# Patient Record
Sex: FEMALE | Race: WHITE | NOT HISPANIC OR LATINO | Employment: FULL TIME | URBAN - METROPOLITAN AREA
[De-identification: names, ages, dates, MRNs, and addresses within clinical notes are randomized per-mention and may not be internally consistent; named-entity substitution may affect disease eponyms.]

---

## 2021-09-22 ENCOUNTER — TELEPHONE (OUTPATIENT)
Dept: FAMILY MEDICINE | Facility: CLINIC | Age: 46
End: 2021-09-22

## 2021-09-22 DIAGNOSIS — K59.00 CONSTIPATION: Primary | ICD-10-CM

## 2021-09-22 RX ORDER — DOCUSATE SODIUM 100 MG/1
100 CAPSULE, LIQUID FILLED ORAL AT BEDTIME
Qty: 30 CAPSULE | Refills: 11 | Status: SHIPPED | OUTPATIENT
Start: 2021-09-22 | End: 2021-10-07 | Stop reason: ALTCHOICE

## 2021-09-22 NOTE — TELEPHONE ENCOUNTER
Pt has apt with Jacuqelyn Garrett 10/7/21 to establish care.   Patient lives at [facility] in [place].    [facility] asking for refill:  Ducosate Sod Cap 100 mg   #30   11 refills  SIg: Take one capsule by mouth at bedtime.

## 2021-10-07 ENCOUNTER — OFFICE VISIT (OUTPATIENT)
Dept: FAMILY MEDICINE | Facility: CLINIC | Age: 46
End: 2021-10-07
Payer: MEDICARE

## 2021-10-07 ENCOUNTER — LAB (OUTPATIENT)
Dept: LAB | Facility: CLINIC | Age: 46
End: 2021-10-07
Payer: MEDICARE

## 2021-10-07 VITALS
BODY MASS INDEX: 36.59 KG/M2 | DIASTOLIC BLOOD PRESSURE: 78 MMHG | TEMPERATURE: 97.6 F | WEIGHT: 219.6 LBS | SYSTOLIC BLOOD PRESSURE: 126 MMHG | HEART RATE: 96 BPM | HEIGHT: 65 IN | OXYGEN SATURATION: 98 % | RESPIRATION RATE: 18 BRPM

## 2021-10-07 DIAGNOSIS — Z12.31 ENCOUNTER FOR SCREENING MAMMOGRAM FOR BREAST CANCER: ICD-10-CM

## 2021-10-07 DIAGNOSIS — F25.0 SCHIZOAFFECTIVE DISORDER, BIPOLAR TYPE (H): ICD-10-CM

## 2021-10-07 DIAGNOSIS — Z79.899 NEED FOR PROPHYLACTIC CHEMOTHERAPY: Primary | ICD-10-CM

## 2021-10-07 DIAGNOSIS — E03.9 HYPOTHYROIDISM, UNSPECIFIED TYPE: Primary | ICD-10-CM

## 2021-10-07 DIAGNOSIS — E03.9 HYPOTHYROIDISM, UNSPECIFIED TYPE: ICD-10-CM

## 2021-10-07 DIAGNOSIS — K59.00 CONSTIPATION, UNSPECIFIED CONSTIPATION TYPE: ICD-10-CM

## 2021-10-07 DIAGNOSIS — Z23 NEED FOR PROPHYLACTIC VACCINATION AND INOCULATION AGAINST INFLUENZA: ICD-10-CM

## 2021-10-07 LAB
CHOLEST SERPL-MCNC: 263 MG/DL
FASTING STATUS PATIENT QL REPORTED: ABNORMAL
FASTING STATUS PATIENT QL REPORTED: ABNORMAL
GLUCOSE BLD-MCNC: 101 MG/DL (ref 70–99)
HBA1C MFR BLD: 5.5 % (ref 0–5.6)
HDLC SERPL-MCNC: 53 MG/DL
LDLC SERPL CALC-MCNC: 167 MG/DL
NONHDLC SERPL-MCNC: 210 MG/DL
TRIGL SERPL-MCNC: 216 MG/DL
TSH SERPL DL<=0.005 MIU/L-ACNC: 1.93 MU/L (ref 0.4–4)

## 2021-10-07 PROCEDURE — 90686 IIV4 VACC NO PRSV 0.5 ML IM: CPT | Performed by: NURSE PRACTITIONER

## 2021-10-07 PROCEDURE — 84443 ASSAY THYROID STIM HORMONE: CPT

## 2021-10-07 PROCEDURE — 80061 LIPID PANEL: CPT

## 2021-10-07 PROCEDURE — 36415 COLL VENOUS BLD VENIPUNCTURE: CPT

## 2021-10-07 PROCEDURE — 99203 OFFICE O/P NEW LOW 30 MIN: CPT | Mod: 25 | Performed by: NURSE PRACTITIONER

## 2021-10-07 PROCEDURE — 90471 IMMUNIZATION ADMIN: CPT | Performed by: NURSE PRACTITIONER

## 2021-10-07 PROCEDURE — 83036 HEMOGLOBIN GLYCOSYLATED A1C: CPT

## 2021-10-07 PROCEDURE — 82947 ASSAY GLUCOSE BLOOD QUANT: CPT

## 2021-10-07 RX ORDER — QUETIAPINE FUMARATE 400 MG/1
400 TABLET, FILM COATED ORAL AT BEDTIME
COMMUNITY
End: 2023-07-20

## 2021-10-07 RX ORDER — LEVOTHYROXINE SODIUM 25 UG/1
25 TABLET ORAL DAILY
COMMUNITY
End: 2021-10-14

## 2021-10-07 RX ORDER — BENZTROPINE MESYLATE 1 MG/1
0.5 TABLET ORAL 2 TIMES DAILY
COMMUNITY
End: 2022-03-21

## 2021-10-07 RX ORDER — FLUPHENAZINE DECANOATE 25 MG/ML
INJECTION, SOLUTION INTRAMUSCULAR; SUBCUTANEOUS
COMMUNITY
End: 2023-07-20

## 2021-10-07 RX ORDER — ACETAMINOPHEN 325 MG/1
650 TABLET ORAL EVERY 6 HOURS PRN
COMMUNITY

## 2021-10-07 RX ORDER — POLYETHYLENE GLYCOL 3350 17 G/17G
1 POWDER, FOR SOLUTION ORAL DAILY PRN
Qty: 850 G | Refills: 11 | Status: SHIPPED | OUTPATIENT
Start: 2021-10-07 | End: 2022-03-28

## 2021-10-07 RX ORDER — ASPIRIN 81 MG
100 TABLET, DELAYED RELEASE (ENTERIC COATED) ORAL DAILY
COMMUNITY
End: 2022-09-13

## 2021-10-07 ASSESSMENT — MIFFLIN-ST. JEOR: SCORE: 1635.94

## 2021-10-07 NOTE — PROGRESS NOTES
Assessment & Plan     Hypothyroidism, unspecified type  Stable with current medication, continue with current levothyroxine dosing.   - TSH with free T4 reflex; Future    Constipation, unspecified constipation type  Work with psych regarding medications.  Start MiraLax and work on a high fiber diet to improve symptoms.  Consider Colonoscopy if symptoms not improving interventions and medications changes.   - polyethylene glycol (MIRALAX) 17 GM/Dose powder; Take 17 g (1 capful) by mouth daily as needed for constipation Hold for loose stools    Need for prophylactic vaccination and inoculation against influenza    - INFLUENZA VACCINE IM > 6 MONTHS VALENT IIV4 (AFLURIA/FLUZONE)    Encounter for screening mammogram for breast cancer    - *MA Screening Digital Bilateral; Future    Return in about 4 weeks (around 11/4/2021), or if symptoms worsen or fail to improve.    KALEB Aguilar CNP  [facility]    Cristina Lewis is a 45 year old who presents for the following health issues  accompanied by her [facility] staff:    HPI     New Patient/Transfer of Care  Chief Complaint   Patient presents with     Establish Care     Forms     recent [facility] [place], physican orders for Admission     Constipation     Imm/Inj     Flu Shot     Constipation      Duration: 3 months    Description:       Frequency of bowel movements: daily if takes MOM       Consistency of stool: hard    Intensity:  mild    Accompanying signs and symptoms: 0       Abdominal pain: no        Rectal pain: YES-only when severely constipated       Blood in stool: YES       Nausea/vomitting: no     History:        Similar problems in past: no     Precipitating or alleviating factors: medication       Medications worsening symptoms: YES    Therapies tried and outcome: MOM, Colace       Chronic laxative use: no     Hoping to go off the Cogentin as this seems to have started symptoms  SAUL Polanco through [place] for med  "management    Review of Systems   Constitutional, HEENT, cardiovascular, pulmonary, gi and gu systems are negative, except as otherwise noted.      Objective    /78   Pulse 96   Temp 97.6  F (36.4  C)   Resp 18   Ht 1.657 m (5' 5.25\")   Wt 99.6 kg (219 lb 9.6 oz)   SpO2 98%   BMI 36.26 kg/m    Body mass index is 36.26 kg/m .  Physical Exam   GENERAL: healthy, alert and no distress  NECK: no adenopathy, no asymmetry, masses, or scars and thyroid normal to palpation  RESP: lungs clear to auscultation - no rales, rhonchi or wheezes  CV: regular rate and rhythm, normal S1 S2, no S3 or S4, no murmur, click or rub, no peripheral edema and peripheral pulses strong  ABDOMEN: soft, nontender, no hepatosplenomegaly, no masses and bowel sounds normal  PSYCH: mentation appears normal and affect flat    Results for orders placed or performed in visit on 10/07/21   Glucose     Status: Abnormal   Result Value Ref Range    Glucose 101 (H) 70 - 99 mg/dL    Patient Fasting > 8hrs? Unknown    Hemoglobin A1c     Status: Normal   Result Value Ref Range    Hemoglobin A1C 5.5 0.0 - 5.6 %   Lipid panel reflex to direct LDL Fasting     Status: Abnormal   Result Value Ref Range    Cholesterol 263 (H) <200 mg/dL    Triglycerides 216 (H) <150 mg/dL    Direct Measure HDL 53 >=50 mg/dL    LDL Cholesterol Calculated 167 (H) <=100 mg/dL    Non HDL Cholesterol 210 (H) <130 mg/dL    Patient Fasting > 8hrs? Unknown     Narrative    Cholesterol  Desirable:  <200 mg/dL    Triglycerides  Normal:  Less than 150 mg/dL  Borderline High:  150-199 mg/dL  High:  200-499 mg/dL  Very High:  Greater than or equal to 500 mg/dL    Direct Measure HDL  Female:  Greater than or equal to 50 mg/dL   Male:  Greater than or equal to 40 mg/dL    LDL Cholesterol  Desirable:  <100mg/dL  Above Desirable:  100-129 mg/dL   Borderline High:  130-159 mg/dL   High:  160-189 mg/dL   Very High:  >= 190 mg/dL    Non HDL Cholesterol  Desirable:  130 mg/dL  Above " Desirable:  130-159 mg/dL  Borderline High:  160-189 mg/dL  High:  190-219 mg/dL  Very High:  Greater than or equal to 220 mg/dL   TSH with free T4 reflex     Status: Normal   Result Value Ref Range    TSH 1.93 0.40 - 4.00 mU/L

## 2021-10-07 NOTE — PATIENT INSTRUCTIONS
Please call [phone] to schedule your mammogram     Add the MiraLax daily as needed for constipation    Follow up if symptoms do not improve or worsen.    Patient Education     Eating a High-Fiber Diet  Fiber is what gives strength and structure to plants. Most grains, beans, vegetables, and fruits contain fiber. Foods rich in fiber are often low in calories and fat, and they fill you up more. They may also reduce your risks for certain health problems. To find out the amount of fiber in canned, packaged, or frozen foods, read the Nutrition Facts label. It tells you how much fiber is in one serving.    Types of fiber and their benefits  There are two types of fiber: insoluble and soluble. They both aid digestion and help you maintain a healthy weight.    Insoluble fiber. This is found in whole grains, cereals, certain fruits and vegetables such as apple skin, corn, and carrots. Insoluble fiber may prevent constipation and reduce the risk for certain types of cancer. It's called insoluble because it doesn't dissolve in water.    Soluble fiber. This type of fiber is in oats, beans, and certain fruits and vegetables such as strawberries and peas. Soluble fiber can reduce cholesterol, which may help lower the risk for heart disease. It also helps control blood sugar levels.  Look for high-fiber foods  Try these foods to add fiber to your diet:    Whole-grain breads and cereals. Try to eat 6 to 8 ounces a day. Include wheat and oat bran cereals, whole-wheat muffins or toast, and corn tortillas in your meals.    Fruits. Try to eat 2 cups a day. Apples, oranges, strawberries, pears, and bananas are good sources. (Note: Fruit juice is low in fiber.)    Vegetables. Try to eat at least 2.5 cups a day. Add asparagus, carrots, broccoli, peas, and corn to your meals.    Beans. One cup of cooked lentils gives you over 15 grams of fiber. Try navy beans, lentils, and chickpeas.    Seeds. A small handful of seeds gives you about  3 grams of fiber. Try sunflower or brandy seeds.  Keep track of your fiber  Keep track of how much fiber you eat. Start by reading food labels. Then eat a variety of foods high in fiber. As you start to eat more fiber, ask your healthcare provider how much water you should be drinking to keep your digestive system working smoothly.  Aim for a certain amount of fiber in your diet each day. The daily value for fiber is 25 grams a day. But some experts advise that women under 50 eat 25 to 28 grams per day and that men under 50 eat 30 to 38 grams per day. After age 50, your daily fiber needs drop to 22 grams for women and 28 grams for men.  Before you reach for the fiber supplements, think about this. Fiber is found naturally in healthy whole foods. It gives you that feeling of fullness after you eat. Taking fiber supplements or eating fiber-enriched foods will not give you this full feeling.  Your fiber intake is a good measure for the quality of your overall diet. If you are missing out on your daily amount of fiber, you may be lacking other important nutrients as well.  [employer] last reviewed this educational content on 7/1/2019 2000-2021 [employer]. All rights reserved. This information is not intended as a substitute for professional medical care. Always follow your healthcare professional's instructions.

## 2021-10-14 DIAGNOSIS — E03.9 HYPOTHYROIDISM, UNSPECIFIED TYPE: Primary | ICD-10-CM

## 2021-10-14 RX ORDER — LEVOTHYROXINE SODIUM 25 UG/1
TABLET ORAL
Qty: 28 TABLET | Refills: 10 | Status: SHIPPED | OUTPATIENT
Start: 2021-10-14 | End: 2022-08-05

## 2021-10-15 ENCOUNTER — TELEPHONE (OUTPATIENT)
Dept: FAMILY MEDICINE | Facility: CLINIC | Age: 46
End: 2021-10-15

## 2021-10-15 DIAGNOSIS — E03.9 HYPOTHYROIDISM, UNSPECIFIED TYPE: ICD-10-CM

## 2021-10-15 RX ORDER — LEVOTHYROXINE SODIUM 25 UG/1
25 TABLET ORAL DAILY
Qty: 28 TABLET | Refills: 10 | Status: CANCELLED | OUTPATIENT
Start: 2021-10-15

## 2021-10-15 NOTE — TELEPHONE ENCOUNTER
[facility] said they did not receive this request.  Requesting to be resent or call them with the Verbal Order.  Pt has one left.  Sona [place] Sec

## 2021-10-26 ENCOUNTER — TELEPHONE (OUTPATIENT)
Dept: FAMILY MEDICINE | Facility: CLINIC | Age: 46
End: 2021-10-26

## 2021-10-26 NOTE — TELEPHONE ENCOUNTER
Reason for call:  Patient reporting a symptom    Symptom or request: Yoselyn from her group home says Debbie has 2 moles she would like to have looked at. One is in the genital area and one in the lower stomach area. She wants to know if she should see Jacquelyn or refer to dermatology.     Phone Number patient can be reached at:  [phone] Yoselyn    Best Time: She will be available at the group home until 3:00 today or tomorrow      Call taken on 10/26/2021 at 12:41 PM by Samia Warren

## 2021-11-01 ENCOUNTER — OFFICE VISIT (OUTPATIENT)
Dept: FAMILY MEDICINE | Facility: CLINIC | Age: 46
End: 2021-11-01
Payer: MEDICARE

## 2021-11-01 VITALS
HEIGHT: 65 IN | WEIGHT: 222 LBS | TEMPERATURE: 98 F | BODY MASS INDEX: 36.99 KG/M2 | RESPIRATION RATE: 12 BRPM | OXYGEN SATURATION: 100 % | HEART RATE: 83 BPM | DIASTOLIC BLOOD PRESSURE: 80 MMHG | SYSTOLIC BLOOD PRESSURE: 130 MMHG

## 2021-11-01 DIAGNOSIS — L82.0 INFLAMED SEBORRHEIC KERATOSIS: Primary | ICD-10-CM

## 2021-11-01 DIAGNOSIS — Z12.83 ENCOUNTER FOR SCREENING FOR MALIGNANT NEOPLASM OF SKIN: ICD-10-CM

## 2021-11-01 PROCEDURE — 99213 OFFICE O/P EST LOW 20 MIN: CPT | Performed by: NURSE PRACTITIONER

## 2021-11-01 ASSESSMENT — MIFFLIN-ST. JEOR: SCORE: 1641.83

## 2021-11-01 NOTE — PROGRESS NOTES
"  Assessment & Plan     Inflamed seborrheic keratosis  Benign appearing skin lesions, offered biopsy due to size and cryotherapy for seborrheic keratosis however Debbie declined at this time. Plan to monitor and follow up with any worsening or ongoing symptoms.     Encounter for screening for malignant neoplasm of skin  Per above.     Return in about 4 weeks (around 11/29/2021), or if symptoms worsen or fail to improve.    KALEB Aguilar CNP  [facility]    Subjective   Debbie is a 46 year old who presents for the following health issues     HPI     Concern - Moles  Onset: Ongoing  Description: Would like looked at, one in genital area and the other is in the lower abdominal area.   Doesn't know if the moles are new or if she has had them for awhile    Review of Systems   Constitutional, HEENT, cardiovascular, pulmonary, gi and gu systems are negative, except as otherwise noted.      Objective    /80 (BP Location: Right arm, Patient Position: Chair, Cuff Size: Adult Large)   Pulse 83   Temp 98  F (36.7  C) (Tympanic)   Resp 12   Ht 1.657 m (5' 5.25\")   Wt 100.7 kg (222 lb)   SpO2 100%   BMI 36.66 kg/m    Body mass index is 36.66 kg/m .  Physical Exam   GENERAL: healthy, alert and no distress  SKIN: keratoses - inflamed seborrheic and 4 mm irregular mole right suprapubic area  PSYCH: mentation appears normal and affect flat                "

## 2021-11-10 ENCOUNTER — ANCILLARY PROCEDURE (OUTPATIENT)
Dept: MAMMOGRAPHY | Facility: CLINIC | Age: 46
End: 2021-11-10
Attending: NURSE PRACTITIONER
Payer: MEDICARE

## 2021-11-10 DIAGNOSIS — Z12.31 ENCOUNTER FOR SCREENING MAMMOGRAM FOR BREAST CANCER: ICD-10-CM

## 2021-11-10 PROCEDURE — 77067 SCR MAMMO BI INCL CAD: CPT | Mod: TC | Performed by: RADIOLOGY

## 2021-12-08 ENCOUNTER — OFFICE VISIT (OUTPATIENT)
Dept: FAMILY MEDICINE | Facility: CLINIC | Age: 46
End: 2021-12-08
Payer: MEDICARE

## 2021-12-08 VITALS
BODY MASS INDEX: 36.32 KG/M2 | SYSTOLIC BLOOD PRESSURE: 110 MMHG | WEIGHT: 218 LBS | HEART RATE: 72 BPM | HEIGHT: 65 IN | TEMPERATURE: 97.8 F | DIASTOLIC BLOOD PRESSURE: 68 MMHG | RESPIRATION RATE: 16 BRPM

## 2021-12-08 DIAGNOSIS — K59.00 CONSTIPATION, UNSPECIFIED CONSTIPATION TYPE: Primary | ICD-10-CM

## 2021-12-08 DIAGNOSIS — E66.812 CLASS 2 OBESITY WITHOUT SERIOUS COMORBIDITY WITH BODY MASS INDEX (BMI) OF 36.0 TO 36.9 IN ADULT, UNSPECIFIED OBESITY TYPE: ICD-10-CM

## 2021-12-08 PROCEDURE — 99213 OFFICE O/P EST LOW 20 MIN: CPT | Performed by: NURSE PRACTITIONER

## 2021-12-08 RX ORDER — POLYETHYLENE GLYCOL 3350 17 G/17G
17 POWDER, FOR SOLUTION ORAL
Qty: 510 G | Refills: 3 | Status: SHIPPED | OUTPATIENT
Start: 2021-12-08 | End: 2022-03-21

## 2021-12-08 ASSESSMENT — MIFFLIN-ST. JEOR: SCORE: 1623.68

## 2021-12-08 NOTE — PROGRESS NOTES
"  Assessment & Plan     Constipation, unspecified constipation type  No acute distress.  Suspect symptoms related to side effects of benztropine as symptoms started with the start of medication.  Previously recommended MiraLax which Debbie takes rarely per the medication administration record.  Recommend taking more regularly with ongoing symptoms as well as working on lifestyle.  Nutrition referral placed.  Symptomatic care and follow up discussed.  - Nutrition Referral; Future  - polyethylene glycol (MIRALAX) 17 GM/Dose powder; Take 17 g by mouth every 3 days For constipation    Class 2 obesity without serious comorbidity with body mass index (BMI) of 36.0 to 36.9 in adult, unspecified obesity type  Work on weight loss.  Nutrition referral per above.   - Nutrition Referral; Future      Return in about 4 weeks (around 1/5/2022), or if symptoms worsen or fail to improve.    KALEB Aguilar CNP  M [facility]    Subjective   Debbie is a 46 year old who presents for the following health issues  accompanied by her group home staff.    HPI     Constipation  Onset/Duration: \"awhile\"   Description:  Frequency of bowel movements: every other   Consistency of stool: hard   Progression of Symptoms: same  Accompanying signs and symptoms:    Abdominal pain: no   Rectal pain: no   Blood in stool: YES   Nausea/Vomiting: no   Weight loss or gain: no  History:   Similar problems in past: YES- states many years ago   History of abdominal surgery: no  Chronic laxative use: no   New medications: decrease benztropine   Precipitating or alleviating factors: none  Therapies tried and outcome: docusate sod and gavilax     No family history of colon cancers  Started when she started the benztropine, did not take the MiraLax as she was worried about becoming dependent on medication    Review of Systems   Constitutional, HEENT, cardiovascular, pulmonary, gi and gu systems are negative, except as otherwise " "noted      Objective    /68   Pulse 72   Temp 97.8  F (36.6  C) (Tympanic)   Resp 16   Ht 1.657 m (5' 5.25\")   Wt 98.9 kg (218 lb)   BMI 36.00 kg/m    Body mass index is 36 kg/m .  Physical Exam   GENERAL: healthy, alert and no distress  NECK: no adenopathy, no asymmetry, masses, or scars and thyroid normal to palpation  RESP: lungs clear to auscultation - no rales, rhonchi or wheezes  CV: regular rate and rhythm, normal S1 S2, no S3 or S4, no murmur  ABDOMEN: soft, nontender, no hepatosplenomegaly, no masses and bowel sounds normal  PSYCH: mentation appears normal and affect flat            "

## 2021-12-10 ENCOUNTER — TELEPHONE (OUTPATIENT)
Dept: FAMILY MEDICINE | Facility: CLINIC | Age: 46
End: 2021-12-10
Payer: MEDICARE

## 2021-12-10 NOTE — TELEPHONE ENCOUNTER
Nutrition Education Scheduling Outreach #1:    Call to patient to schedule. Patient declined to schedule.    Mayra Bhagat  [facility]  Diabetes and Nutrition Scheduling

## 2022-02-02 ENCOUNTER — OFFICE VISIT (OUTPATIENT)
Dept: FAMILY MEDICINE | Facility: CLINIC | Age: 47
End: 2022-02-02
Payer: MEDICARE

## 2022-02-02 VITALS
SYSTOLIC BLOOD PRESSURE: 130 MMHG | RESPIRATION RATE: 16 BRPM | HEIGHT: 65 IN | WEIGHT: 224 LBS | BODY MASS INDEX: 37.32 KG/M2 | TEMPERATURE: 98.7 F | DIASTOLIC BLOOD PRESSURE: 74 MMHG | HEART RATE: 88 BPM

## 2022-02-02 DIAGNOSIS — R22.9 LUMP OF SKIN: Primary | ICD-10-CM

## 2022-02-02 PROCEDURE — 10060 I&D ABSCESS SIMPLE/SINGLE: CPT | Performed by: NURSE PRACTITIONER

## 2022-02-02 ASSESSMENT — MIFFLIN-ST. JEOR: SCORE: 1650.9

## 2022-02-02 NOTE — PROGRESS NOTES
"  Assessment & Plan     Lump of skin  Concern for infection with presentation, I and D was completed after informed consent was obtained.  Procedure:  Risks and benefits discussed with pt, she verbalized understanding and wished to proceed with I and D.  0.1 ml of 1% lidocaine with epi injected into area and small incision made with a 16 blade.   0.5 cm clot was evacuated without any purulent fluid. Discomfort resolved following procedure.  No complications during procedure and minimal blood loss.  - Incision and drainage      Return in about 1 week (around 2/9/2022), or if symptoms worsen or fail to improve.    KALEB Aguilar CNP  M [facility]    Cristina Lewis is a 46 year old who presents for the following health issues     HPI     Concern - Bump  Onset: last few days   Description: raised bump on right lower leg   Intensity: moderate  Progression of Symptoms:  worsening  Accompanying Signs & Symptoms: discolored   Previous history of similar problem: none   Therapies tried and outcome:  none     No known injury    Review of Systems   Constitutional, HEENT, cardiovascular, pulmonary, gi and gu systems are negative, except as otherwise noted.      Objective    /74 (Cuff Size: Adult Large)   Pulse 88   Temp 98.7  F (37.1  C) (Tympanic)   Resp 16   Ht 1.657 m (5' 5.25\")   Wt 101.6 kg (224 lb)   BMI 36.99 kg/m    Body mass index is 36.99 kg/m .  Physical Exam   GENERAL: healthy, alert and no distress  SKIN: erythematous tender lump right anterior LE  PSYCH: mentation appears normal, affect normal/bright          "

## 2022-02-03 ENCOUNTER — TELEPHONE (OUTPATIENT)
Dept: FAMILY MEDICINE | Facility: CLINIC | Age: 47
End: 2022-02-03
Payer: MEDICARE

## 2022-02-03 NOTE — TELEPHONE ENCOUNTER
Reason for Call:  Other prescription    Detailed comments: 3 ml LL syrng Mis 22 GX 1 Use as directed to inject fluphenazine Every 2 weeks    Phone Number Patient can be reached at: Other phone number:  [phone]*    Best Time: Any Time      Can we leave a detailed message on this number? YES    Call taken on 2/3/2022 at 12:29 PM by Sona Wilson

## 2022-02-03 NOTE — TELEPHONE ENCOUNTER
I do not prescribe this, please find out who does and send it to them.      Thanks,     KALEB Aguilar CNP

## 2022-02-03 NOTE — TELEPHONE ENCOUNTER
Call placed to group home. This medication is prescribed by VASQUEZ Ferguson.    Group home advised to reach out to provider for regarding ordering needles and syringes.

## 2022-02-15 DIAGNOSIS — F99 MENTAL HEALTH DISORDER: Primary | ICD-10-CM

## 2022-02-15 RX ORDER — SYRINGE WITH NEEDLE, 1 ML 25GX5/8"
SYRINGE, EMPTY DISPOSABLE MISCELLANEOUS
Qty: 50 EACH | Refills: 10 | Status: SHIPPED | OUTPATIENT
Start: 2022-02-15

## 2022-02-22 ENCOUNTER — TELEPHONE (OUTPATIENT)
Dept: FAMILY MEDICINE | Facility: CLINIC | Age: 47
End: 2022-02-22
Payer: MEDICARE

## 2022-02-22 NOTE — TELEPHONE ENCOUNTER
Reason for Call:  DMV Letter     Detailed comments: Pt is requesting a letter from Provider that she is able to drive on her Psychotic Medications per Pt.  Fax [phone] DMV [facility].  Does pt need appt?   Pt said she had gotten [facility].      Phone Number Patient can be reached at: Home number on file [phone] (home)    Best Time: Any Time      Can we leave a detailed message on this number? YES    Call taken on 2/22/2022 at 9:05 AM by Sona Wilson

## 2022-02-22 NOTE — TELEPHONE ENCOUNTER
Is her psychiatrist able to write this note?  I do not follow her for her mental health.    KALEB Aguilar CNP

## 2022-03-21 ENCOUNTER — OFFICE VISIT (OUTPATIENT)
Dept: FAMILY MEDICINE | Facility: CLINIC | Age: 47
End: 2022-03-21
Payer: MEDICARE

## 2022-03-21 VITALS
HEART RATE: 84 BPM | TEMPERATURE: 97.7 F | WEIGHT: 220 LBS | SYSTOLIC BLOOD PRESSURE: 110 MMHG | HEIGHT: 62 IN | DIASTOLIC BLOOD PRESSURE: 62 MMHG | BODY MASS INDEX: 40.48 KG/M2 | RESPIRATION RATE: 16 BRPM

## 2022-03-21 DIAGNOSIS — K59.03 DRUG-INDUCED CONSTIPATION: ICD-10-CM

## 2022-03-21 DIAGNOSIS — Z01.419 ENCOUNTER FOR GYNECOLOGICAL EXAMINATION WITHOUT ABNORMAL FINDING: ICD-10-CM

## 2022-03-21 DIAGNOSIS — Z00.00 ENCOUNTER FOR MEDICARE ANNUAL WELLNESS EXAM: Primary | ICD-10-CM

## 2022-03-21 DIAGNOSIS — E03.9 HYPOTHYROIDISM, UNSPECIFIED TYPE: ICD-10-CM

## 2022-03-21 PROBLEM — K59.00 CONSTIPATION: Status: ACTIVE | Noted: 2022-03-21

## 2022-03-21 PROCEDURE — 99396 PREV VISIT EST AGE 40-64: CPT | Performed by: NURSE PRACTITIONER

## 2022-03-21 PROCEDURE — G0145 SCR C/V CYTO,THINLAYER,RESCR: HCPCS | Performed by: NURSE PRACTITIONER

## 2022-03-21 PROCEDURE — 87624 HPV HI-RISK TYP POOLED RSLT: CPT | Performed by: NURSE PRACTITIONER

## 2022-03-21 PROCEDURE — 99213 OFFICE O/P EST LOW 20 MIN: CPT | Mod: 25 | Performed by: NURSE PRACTITIONER

## 2022-03-21 RX ORDER — MIRTAZAPINE 15 MG/1
15 TABLET, FILM COATED ORAL AT BEDTIME
COMMUNITY
End: 2023-05-31

## 2022-03-21 ASSESSMENT — ENCOUNTER SYMPTOMS
CHILLS: 0
FEVER: 0
DYSURIA: 0
SHORTNESS OF BREATH: 0
HEARTBURN: 0
HEMATURIA: 0
EYE PAIN: 0
BREAST MASS: 0
CONSTIPATION: 1
COUGH: 0
ROS GI COMMENTS: INTERMITTENT CONSTIPATION
HEMATOCHEZIA: 0
PALPITATIONS: 0
NAUSEA: 0
HEADACHES: 0
ARTHRALGIAS: 0
WEAKNESS: 0
FREQUENCY: 0
JOINT SWELLING: 0
PARESTHESIAS: 0
ABDOMINAL PAIN: 0
NERVOUS/ANXIOUS: 0
DIZZINESS: 1
LIGHT-HEADEDNESS: 1
MYALGIAS: 0
DIARRHEA: 0
SORE THROAT: 0

## 2022-03-21 ASSESSMENT — ACTIVITIES OF DAILY LIVING (ADL): CURRENT_FUNCTION: NO ASSISTANCE NEEDED

## 2022-03-21 NOTE — PROGRESS NOTES
"   SUBJECTIVE:   CC: Debbie Smalls is an 46 year old woman who presents for preventive health visit.       Patient has been advised of split billing requirements and indicates understanding: Yes  Healthy Habits:     In general, how would you rate your overall health?  Good    Frequency of exercise:  2-3 days/week    Duration of exercise:  15-30 minutes    Do you usually eat at least 4 servings of fruit and vegetables a day, include whole grains    & fiber and avoid regularly eating high fat or \"junk\" foods?  No    Taking medications regularly:  No    Barriers to taking medications:  Side effects and Other    Medication side effects:  Lightheadedness    Ability to successfully perform activities of daily living:  No assistance needed    Home Safety:  No safety concerns identified    Hearing Impairment:  No hearing concerns    In the past 6 months, have you been bothered by leaking of urine?  No    In general, how would you rate your overall mental or emotional health?  Fair      PHQ-2 Total Score: 2    Additional concerns today:  No      Today's PHQ-2 Score:   PHQ-2 ( 1999 [employer]) 3/21/2022   Q1: Little interest or pleasure in doing things 1   Q2: Feeling down, depressed or hopeless 1   PHQ-2 Score 2   Q1: Little interest or pleasure in doing things Several days   Q2: Feeling down, depressed or hopeless Several days   PHQ-2 Score 2       Abuse: Current or Past (Physical, Sexual or Emotional) - No  Do you feel safe in your environment? Yes    Have you ever done Advance Care Planning? (For example, a Health Directive, POLST, or a discussion with a medical provider or your loved ones about your wishes): No, advance care planning information given to patient to review.  Patient declined advance care planning discussion at this time.    Social History     Tobacco Use     Smoking status: Never Smoker     Smokeless tobacco: Never Used   Substance Use Topics     Alcohol use: Not Currently         Alcohol Use 3/21/2022 "   Prescreen: >3 drinks/day or >7 drinks/week? Not Applicable       Reviewed orders with patient.  Reviewed health maintenance and updated orders accordingly - Yes  Lab work is in process    Breast Cancer Screening:    FHS-7:   Breast CA Risk Assessment (FHS-7) 11/10/2021 3/21/2022   Did any of your first-degree relatives have breast or ovarian cancer? No Yes   Did any of your relatives have bilateral breast cancer? No Unknown   Did any man in your family have breast cancer? No No   Did any woman in your family have breast and ovarian cancer? No Yes   Did any woman in your family have breast cancer before age 50 y? No Yes   Do you have 2 or more relatives with breast and/or ovarian cancer? No No   Do you have 2 or more relatives with breast and/or bowel cancer? No No     Pertinent mammograms are reviewed under the imaging tab.    History of abnormal Pap smear: denies history of abnormal PAP, reports last done in 2018 in [place]   PAP / HPV Latest Ref Rng & Units 3/21/2022   PAP   Negative for Intraepithelial Lesion or Malignancy (NILM)     Reviewed and updated as needed this visit by clinical staff   Tobacco  Allergies  Meds  Problems  Med Hx  Surg Hx  Fam Hx  Soc   Hx         Hypothyroidism  Patient has been taking levothyroxine 25 MCG daily. Patient says she is not doing well and wants levothyroxine 25 MCG stopped.She does not feel like she needs. She does not feel any different taking medication.  She does not feel tired, no joint pain, stiffness or swelling.  Dry and itchy skin, especially bilateral lower legs.  Lotion helps.    Constipation  Patient has been taking colace 100 MG daily and Mirlax 17 GM every three days.  She feels diet is okay.She does not drink enough fluids.  Her exercise working as a  and walking 2-3/week for 45 minutes.   Started when put on the Cogentin and improved for a few days when this was discontinued but then started mirtazapine and symptoms returned.    Reviewed and  "updated as needed this visit by Provider   Tobacco  Allergies  Meds  Problems  Med Hx  Surg Hx  Fam Hx           Review of Systems   Constitutional: Negative for chills and fever.   HENT: Negative for congestion, ear pain, hearing loss and sore throat.    Eyes: Positive for visual disturbance. Negative for pain.        Blurry vision.  Followed optometrist.  Eye exam on 10/2021. Recommendation for glasses.   Respiratory: Negative for cough and shortness of breath.    Cardiovascular: Negative for chest pain, palpitations and peripheral edema.   Gastrointestinal: Positive for constipation. Negative for abdominal pain, diarrhea, heartburn, hematochezia and nausea.        Intermittent constipation   Breasts:  Negative for tenderness, breast mass and discharge.   Genitourinary: Negative for dysuria, frequency, genital sores, hematuria, pelvic pain, urgency, vaginal bleeding and vaginal discharge.   Musculoskeletal: Negative for arthralgias, joint swelling and myalgias.   Skin: Negative for rash.   Neurological: Positive for dizziness and light-headedness. Negative for weakness, headaches and paresthesias.        Dizziness with taking Seroquel 400 MD daily. Followed by mental health provider.   Psychiatric/Behavioral: Negative for mood changes. The patient is not nervous/anxious.       OBJECTIVE:   /62 (Cuff Size: Adult Large)   Pulse 84   Temp 97.7  F (36.5  C) (Tympanic)   Resp 16   Ht 1.581 m (5' 2.25\")   Wt 99.8 kg (220 lb)   LMP 03/02/2022   BMI 39.92 kg/m    Physical Exam  GENERAL: healthy, alert and no distress  EYES: Eyes grossly normal to inspection, PERRL and conjunctivae and sclerae normal  HENT: ear canals and TM's normal, nose and mouth without ulcers or lesions  NECK: no adenopathy, no asymmetry, masses, or scars and thyroid normal to palpation  RESP: lungs clear to auscultation - no rales, rhonchi or wheezes  BREAST: normal without masses, tenderness or nipple discharge and no palpable " "axillary masses or adenopathy  CV: regular rate and rhythm, normal S1 S2, no S3 or S4, no murmur, click or rub, no peripheral edema and peripheral pulses strong  ABDOMEN: soft, nontender, no hepatosplenomegaly, no masses and bowel sounds normal   (female): normal female external genitalia, normal urethral meatus , vaginal mucosa pink, moist, well rugated and normal cervix, adnexae, and uterus without masses.  MS: no gross musculoskeletal defects noted, no edema  SKIN: no suspicious lesions or rashes  NEURO: Normal strength and tone, mentation intact and speech normal  PSYCH: mentation appears normal, affect flat    Diagnostic Test Results:  Labs reviewed in Epic    ASSESSMENT/PLAN:   (Z00.00) Encounter for Medicare annual wellness exam  (primary encounter diagnosis)    (Z01.419) Encounter for gynecological examination without abnormal finding  Comment: updated today  Plan: Gynecologic Cytology (PAP)         (E03.9) Hypothyroidism, unspecified type  Comment:  Plan to discontinue Levothyroxine 25 MCG per pt request, TSH on 10/7/2021 1.93 and on low dose so reasonable to do a trial off and monitor. Discussed with patient risk of increased constipation with stopping medication. Agreed to implement change and call if experiences increased constipation or other side effects. Recheck TSH in 6-8 weeks.  Plan: TSH with free T4 reflex          (K59.00) Constipation  Comment: worsened again with starting the mirtazapine.  Continue Colace 100 MG daily and Mirlax 17 GM PRN. Exercise and increased fiber in diet encouraged.    COUNSELING:  Reviewed preventive health counseling, as reflected in patient instructions    Estimated body mass index is 39.92 kg/m  as calculated from the following:    Height as of this encounter: 1.581 m (5' 2.25\").    Weight as of this encounter: 99.8 kg (220 lb).    Weight management plan: Discussed healthy diet and exercise guidelines    She reports that she has never smoked. She has never used " smokeless tobacco.      Counseling Resources:  ATP IV Guidelines  Pooled Cohorts Equation Calculator  Breast Cancer Risk Calculator  BRCA-Related Cancer Risk Assessment: FHS-7 Tool  FRAX Risk Assessment  ICSI Preventive Guidelines  Dietary Guidelines for Americans, 2010  USDA's MyPlate  ASA Prophylaxis  Lung CA Screening    KALEB Aguilar CNP  [facility]

## 2022-03-21 NOTE — PATIENT INSTRUCTIONS
Plan to do a trial off the levothyroxine and recheck labs in 2 months with a lab only appointment    Patient Education   Personalized Prevention Plan  You are due for the preventive services outlined below.  Your care team is available to assist you in scheduling these services.  If you have already completed any of these items, please share that information with your care team to update in your medical record.  Health Maintenance Due   Topic Date Due     ANNUAL REVIEW OF HM ORDERS  Never done     Colorectal Cancer Screening  Never done     HIV Screening  Never done     Annual Wellness Visit  Never done     Hepatitis C Screening  Never done     PAP Smear  Never done       Understanding USDA MyPlate  The USDA has guidelines to help you make healthy food choices. These are called MyPlate. MyPlate shows the food groups that make up healthy meals using the image of a place setting. Before you eat, think about the healthiest choices for what to put on your plate or in your cup or bowl. To learn more about building a healthy plate, visit www.FriendFitplate.gov.    The food groups    Fruits. Any fruit or 100% fruit juice counts as part of the Fruit Group. Fruits may be fresh, canned, frozen, or dried, and may be whole, cut-up, or pureed. Make 1/2 of your plate fruits and vegetables.    Vegetables. Any vegetable or 100% vegetable juice counts as a member of the Vegetable Group. Vegetables may be fresh, frozen, canned, or dried. They can be served raw or cooked and may be whole, cut-up, or mashed. Make 1/2 of your plate fruits and vegetables.    Grains. All foods made from grains are part of the Grains Group. These include wheat, rice, oats, cornmeal, and barley. Grains are often used to make foods such as bread, pasta, oatmeal, cereal, tortillas, and grits. Grains should be no more than 1/4 of your plate. At least half of your grains should be whole grains.    Protein. This group includes meat, poultry, seafood, beans and  peas, eggs, processed soy products (such as tofu), nuts (including nut butters), and seeds. Make protein choices no more than 1/4 of your plate. Meat and poultry choices should be lean or low fat.    Dairy. The Dairy Group includes all fluid milk products and foods made from milk that contain calcium, such as yogurt and cheese. (Foods that have little calcium, such as cream, butter, and cream cheese, are not part of this group.) Most dairy choices should be low-fat or fat-free.    Oils. Oils aren't a food group, but they do contain essential nutrients. However it's important to watch your intake of oils. These are fats that are liquid at room temperature. They include canola, corn, olive, soybean, vegetable, and sunflower oil. Foods that are mainly oil include mayonnaise, certain salad dressings, and soft margarines. You likely already get your daily oil allowance from the foods you eat.  Things to limit  Eating healthy also means limiting these things in your diet:       Salt (sodium). Many processed foods have a lot of sodium. To keep sodium intake down, eat fresh vegetables, meats, poultry, and seafood when possible. Purchase low-sodium, reduced-sodium, or no-salt-added food products at the store. And don't add salt to your meals at home. Instead, season them with herbs and spices such as dill, oregano, cumin, and paprika. Or try adding flavor with lemon or lime zest and juice.    Saturated fat. Saturated fats are most often found in animal products such as beef, pork, and chicken. They are often solid at room temperature, such as butter. To reduce your saturated fat intake, choose leaner cuts of meat and poultry. And try healthier cooking methods such as grilling, broiling, roasting, or baking. For a simple lower-fat swap, use plain nonfat yogurt instead of mayonnaise when making potato salad or macaroni salad.    Added sugars. These are sugars added to foods. They are in foods such as ice cream, candy, soda,  fruit drinks, sports drinks, energy drinks, cookies, pastries, jams, and syrups. Cut down on added sugars by sharing sweet treats with a family member or friend. You can also choose fruit for dessert, and drink water or other unsweetened beverages.     [employer] last reviewed this educational content on 6/1/2020 2000-2021 [employer]. All rights reserved. This information is not intended as a substitute for professional medical care. Always follow your healthcare professional's instructions.        Your Health Risk Assessment indicates you feel you are not in good emotional health.    Recreation   Recreation is not limited to sports and team events. It includes any activity that provides relaxation, interest, enjoyment, and exercise. Recreation provides an outlet for physical, mental, and social energy. It can give a sense of worth and achievement. It can help you stay healthy.    Mental Exercise and Social Involvement  Mental and emotional health is as important as physical health. Keep in touch with friends and family. Stay as active as possible. Continue to learn and challenge yourself.   Things you can do to stay mentally active are:    Learn something new, like a foreign language or musical instrument.     Play SCRABBLE or do crossword puzzles. If you cannot find people to play these games with you at home, you can play them with others on your computer through the Internet.     Join a games club--anything from card games to chess or checkers or lawn bowling.     Start a new hobby.     Go back to school.     Volunteer.     Read.   Keep up with world events.     Patient Education     High-Fiber Diet  Fiber is in fruits, vegetables, cereals, and grains. Fiber passes through your body undigested. A high-fiber diet helps food move through your intestinal tract. The added bulk can help prevent constipation. In people with small pouches in the colon (diverticulosis), fiber helps clean out the pouches  along the colon wall. It also prevents new pouches from forming. A high-fiber diet reduces the risk of colon cancer. It also lowers blood cholesterol and prevents high blood sugar in people with diabetes.     The high-fiber foods that are listed below should be part of your diet. If you are not used to eating high-fiber foods, start with 1 or 2 foods from this list. Every 3 to 4 days add a new food to your diet. Do this until you are eating 4 high-fiber foods per day. This should give you 20 to 35 grams of fiber a day. You need to drink a lot of water when you are on this diet. You should have 6 to 8 glasses of water a day. Water makes the fiber swell and increases the benefit.   Foods high in fiber  These foods are high in fiber:    Breads. Breads made with 100% whole-wheat flour; surya, wheat, or rye crackers; whole-grain tortillas, bran muffins    Cereals. Whole-grain and bran cereals with bran (shredded wheat, wheat flakes, raisin bran, corn bran); oatmeal, rolled oats, granola, and brown rice    Fruits. Fresh fruits and their edible skins (pears, prunes, raisins, berries, apples, and apricots); bananas, citrus fruit, mangoes, pineapple; and prune juice    Nuts and seeds. Any nuts and seeds, such as walnuts, peanuts, almonds, and pumpkin seeds    Vegetables. This includes all vegetables. They are best served raw or lightly cooked. Those higher in fiber include green peas, celery, eggplant, potatoes, spinach, broccoli, [place] sprouts, winter squash, carrots, cauliflower, soybeans, lentils, and fresh and dried beans of all kinds.    Other. Popcorn; any spices  If you have diverticulosis  There aren't any specific foods to avoid if you have diverticulosis. But each person is different. There may be some foods that make your symptoms worse. Keep track and don t eat foods that make you feel worse.   [employer] last reviewed this educational content on 2/1/2020 2000-2021 [employer]. All rights  reserved. This information is not intended as a substitute for professional medical care. Always follow your healthcare professional's instructions.

## 2022-03-21 NOTE — PROGRESS NOTES
"Debbie Smalls is 46 year old, here for a preventive care visit.    Assessment & Plan   {Provider  Link to [facility] SmartSet :587557}  {Diagnosis Options:720641}    Growth        {GROWTH:219831}    {BMI Evaluation :270952::\"No weight concerns.\"}    Immunizations     {Vaccine counseling is expected when vaccines are given for the first time.   Vaccine counseling would not be expected for subsequent vaccines (after the first of the series) unless there is significant additional documentation (Optional):045405}  MenB Vaccine {MenB Vaccine:856431}    Anticipatory Guidance    Reviewed age appropriate anticipatory guidance.           Referrals/Ongoing Specialty Care  {Referrals/Ongoing Specialty Care:777302}    Follow Up      No follow-ups on file.    Subjective   {Rooming Staff  Remember to place Screening for Ped Immunizations order or document responses at bottom of note :307488}  No flowsheet data found.  {Patient advised of split billing (Optional):918864}  {MDM Documentation Add On (Optional):77307}  ***    No flowsheet data found.    No flowsheet data found.       No flowsheet data found.  {TIP  Consider immunosuppression as a risk factor for TB:425543}       No flowsheet data found.        No flowsheet data found.  No flowsheet data found.          Teen Screen  {Results  (18-20 YRS):047204}  {Provider  Link to Confidential Note :514373}  No flowsheet data found.    {Review of Systems (Optional):175518}       Objective     Exam    Physical Exam        {Immunization Screening- Place Screening for Ped Immunizations order or choose appropriate list to document responses in note (Optional):416539}    KALEB Aguilar [facility]    The patient was counseled and encouraged to consider modifying their diet and eating habits. She was provided with information on recommended healthy diet options.  The patient was provided with suggestions to help her develop a healthy emotional " lifestyle.

## 2022-03-23 LAB
BKR LAB AP GYN ADEQUACY: NORMAL
BKR LAB AP GYN INTERPRETATION: NORMAL
BKR LAB AP HPV REFLEX: NORMAL
BKR LAB AP LMP: NORMAL
BKR LAB AP PREVIOUS ABNORMAL: NORMAL
PATH REPORT.COMMENTS IMP SPEC: NORMAL
PATH REPORT.COMMENTS IMP SPEC: NORMAL
PATH REPORT.RELEVANT HX SPEC: NORMAL

## 2022-03-25 LAB
HUMAN PAPILLOMA VIRUS 16 DNA: NEGATIVE
HUMAN PAPILLOMA VIRUS 18 DNA: NEGATIVE
HUMAN PAPILLOMA VIRUS FINAL DIAGNOSIS: NORMAL
HUMAN PAPILLOMA VIRUS OTHER HR: NEGATIVE

## 2022-03-26 DIAGNOSIS — K59.00 CONSTIPATION, UNSPECIFIED CONSTIPATION TYPE: ICD-10-CM

## 2022-03-28 RX ORDER — POLYETHYLENE GLYCOL 3350 17 G/17G
1 POWDER, FOR SOLUTION ORAL DAILY PRN
Qty: 850 G | Refills: 11 | Status: SHIPPED | OUTPATIENT
Start: 2022-03-28

## 2022-04-05 ENCOUNTER — TELEPHONE (OUTPATIENT)
Dept: FAMILY MEDICINE | Facility: CLINIC | Age: 47
End: 2022-04-05
Payer: MEDICARE

## 2022-04-05 NOTE — TELEPHONE ENCOUNTER
Medications in question are Seroquel, Prolixin and Remeron. She says her psychiatrist wrote a note but the DMV wanted more information, she called their office back and she tells me they refused to write what she wanted. She currently does not have a drivers liscence and is trying to get one. We spoke for 15 minutes and I repeated at least 10 times that the note for the DMV needs to come from her psychiatrist as he prescribes and manages her medications. She says her old FP in [place] wrote this for her and she is going to schedule an appointment with Jacquelyn to discuss this.

## 2022-04-05 NOTE — TELEPHONE ENCOUNTER
Reason for Call:  DMV - Letter     Detailed comments:  Pt is asking if she can get letter for the DMV that it is ok for you to drive while on her medications.   Call Pt when letter has been completed.     Phone Number Patient can be reached at: Home number on file [phone] (home)    Best Time: Any Time      Can we leave a detailed message on this number? YES    Call taken on 4/5/2022 at 9:02 AM by Sona Wilson

## 2022-04-11 ENCOUNTER — OFFICE VISIT (OUTPATIENT)
Dept: FAMILY MEDICINE | Facility: CLINIC | Age: 47
End: 2022-04-11
Payer: MEDICARE

## 2022-04-11 VITALS
HEART RATE: 88 BPM | RESPIRATION RATE: 16 BRPM | HEIGHT: 62 IN | BODY MASS INDEX: 41.41 KG/M2 | WEIGHT: 225 LBS | DIASTOLIC BLOOD PRESSURE: 82 MMHG | SYSTOLIC BLOOD PRESSURE: 122 MMHG | TEMPERATURE: 98.6 F

## 2022-04-11 DIAGNOSIS — F99 MENTAL HEALTH DISORDER: Primary | ICD-10-CM

## 2022-04-11 PROCEDURE — 99213 OFFICE O/P EST LOW 20 MIN: CPT | Performed by: NURSE PRACTITIONER

## 2022-04-11 NOTE — PROGRESS NOTES
"  Assessment & Plan     Mental health disorder  No acute distress.  Mental health is well controlled and no recent medications changes and followed closely by mental health and medications administered by group home staff. Note provided for DMV.       Return in about 4 weeks (around 5/9/2022), or if symptoms worsen or fail to improve.    KALEB Aguilar CNP  [facility]    Cristina Lewis is a 46 year old who presents for the following health issues     History of Present Illness       Reason for visit:  For a driving evaluation    She eats 2-3 servings of fruits and vegetables daily.She consumes 1 sweetened beverage(s) daily.She exercises with enough effort to increase her heart rate 20 to 29 minutes per day.  She exercises with enough effort to increase her heart rate 4 days per week.   She is taking medications regularly.     [facility]  No recent medication changes  Followed every three months      Review of Systems   Constitutional, HEENT, cardiovascular, pulmonary, gi and gu systems are negative, except as otherwise noted.      Objective    /82 (Cuff Size: Adult Large)   Pulse 88   Temp 98.6  F (37  C) (Tympanic)   Resp 16   Ht 1.581 m (5' 2.25\")   Wt 102.1 kg (225 lb)   LMP 04/02/2022   BMI 40.82 kg/m    Body mass index is 40.82 kg/m .  Physical Exam   GENERAL: healthy, alert and no distress  NEURO: Normal strength and tone, mentation intact and speech normal  PSYCH: mentation appears normal, affect normal/bright        "

## 2022-04-11 NOTE — LETTER
April 11, 2022      Debbie Smalls  [address] [address]        To Whom It May Concern:    Debbie Smalls was seen in our clinic. She can safely operate a motor vehicle while on her medications.      Sincerely,        KALEB Aguilar CNP

## 2022-04-11 NOTE — LETTER
April 11, 2022      Debbie Smalls  [address] [address]        To Whom It May Concern:    Debbie Smalls has been followed by this provider since October 2021.  She can safely operate a motor vehicle while on her medications.      Sincerely,        KALEB Aguilar CNP

## 2022-04-15 ENCOUNTER — TELEPHONE (OUTPATIENT)
Dept: FAMILY MEDICINE | Facility: CLINIC | Age: 47
End: 2022-04-15
Payer: MEDICARE

## 2022-04-15 NOTE — TELEPHONE ENCOUNTER
Jacquelyn issued letter 04-11-22 stating she can safely operat a motor vehicle while on medications.  Pt requesting Jacquelyn add that Jacquelyn is aware psych provider unable to provide opinion re: thiago.  CHITO Chi RN

## 2022-04-15 NOTE — TELEPHONE ENCOUNTER
Please clarify, her mental health provider should be able to write her a letter regarding the state of her mental health so I cannot add that they are unable to provide an opinion.     KALEB Aguilar CNP

## 2022-05-01 PROBLEM — E03.9 HYPOTHYROIDISM, UNSPECIFIED TYPE: Status: ACTIVE | Noted: 2022-05-01

## 2022-07-28 ENCOUNTER — TELEPHONE (OUTPATIENT)
Dept: FAMILY MEDICINE | Facility: CLINIC | Age: 47
End: 2022-07-28

## 2022-07-28 NOTE — TELEPHONE ENCOUNTER
Pt needs a letter, about driving. It needs to say what her condition is and that she is safe to drive on the hiway and public roads. Pt needs this soon. Pt wants letter printed and placed at  when completed. Pt stated that Jacquelyn Garrett has completed on of these letters before.     Can leave detailed message on VM: yes    Marysol Camarena RN

## 2022-07-29 NOTE — TELEPHONE ENCOUNTER
I wrote her a letter in April that you can print. If she needs it signed, I can do it on Monday.    KALEB Aguilar CNP

## 2022-08-01 ENCOUNTER — OFFICE VISIT (OUTPATIENT)
Dept: FAMILY MEDICINE | Facility: CLINIC | Age: 47
End: 2022-08-01
Payer: MEDICARE

## 2022-08-01 VITALS
DIASTOLIC BLOOD PRESSURE: 80 MMHG | HEART RATE: 84 BPM | TEMPERATURE: 98.5 F | WEIGHT: 247 LBS | HEIGHT: 62 IN | SYSTOLIC BLOOD PRESSURE: 124 MMHG | BODY MASS INDEX: 45.45 KG/M2 | RESPIRATION RATE: 16 BRPM

## 2022-08-01 DIAGNOSIS — R73.09 ELEVATED GLUCOSE: ICD-10-CM

## 2022-08-01 DIAGNOSIS — E66.01 MORBID OBESITY (H): ICD-10-CM

## 2022-08-01 DIAGNOSIS — E78.5 HYPERLIPIDEMIA LDL GOAL <160: ICD-10-CM

## 2022-08-01 DIAGNOSIS — E03.9 HYPOTHYROIDISM, UNSPECIFIED TYPE: Primary | ICD-10-CM

## 2022-08-01 LAB
CHOLEST SERPL-MCNC: 285 MG/DL
FASTING STATUS PATIENT QL REPORTED: NO
HBA1C MFR BLD: 5.7 % (ref 0–5.6)
HDLC SERPL-MCNC: 41 MG/DL
LDLC SERPL CALC-MCNC: 171 MG/DL
LDLC SERPL CALC-MCNC: ABNORMAL MG/DL
NONHDLC SERPL-MCNC: 244 MG/DL
TRIGL SERPL-MCNC: 589 MG/DL
TSH SERPL DL<=0.005 MIU/L-ACNC: 2.97 MU/L (ref 0.4–4)

## 2022-08-01 PROCEDURE — 84443 ASSAY THYROID STIM HORMONE: CPT | Performed by: NURSE PRACTITIONER

## 2022-08-01 PROCEDURE — 99214 OFFICE O/P EST MOD 30 MIN: CPT | Performed by: NURSE PRACTITIONER

## 2022-08-01 PROCEDURE — 83721 ASSAY OF BLOOD LIPOPROTEIN: CPT | Mod: 59 | Performed by: NURSE PRACTITIONER

## 2022-08-01 PROCEDURE — 36415 COLL VENOUS BLD VENIPUNCTURE: CPT | Performed by: NURSE PRACTITIONER

## 2022-08-01 PROCEDURE — 83036 HEMOGLOBIN GLYCOSYLATED A1C: CPT | Performed by: NURSE PRACTITIONER

## 2022-08-01 PROCEDURE — 80061 LIPID PANEL: CPT | Performed by: NURSE PRACTITIONER

## 2022-08-01 NOTE — LETTER
August 1, 2022      Debbie Smalls  [address] [address]        To Whom It May Concern:    Debbie Smalls has been followed by this provider since October 2021.  She can safely operate a motor vehicle while on her medications..      Sincerely,        KALEB Aguilar CNP

## 2022-08-01 NOTE — LETTER
August 1, 2022      Debbie Smalls  [address] [address]        To Whom It May Concern:    Debbie Smalls has been followed by this provider since October 2021.  She can safely operate a motor vehicle on public highways and streets while on her medications.  She is compliant with treatments and is doing well.     Sincerely,        KALEB Aguilar CNP

## 2022-08-01 NOTE — LETTER
August 3, 2022      Debbie Smalls  [address] [address]        Dear ,    We are writing to inform you of your test results.    cholesterol is significantly elevated and hemoglobin A1C is elevated at 5.7 putting you in the pre-diabetic range.  Your 10 year risk of a cardiovascular event is calculated at 2.5 % so we can wait on medication at this time but you do need to work on your lifestyle to bring these levels down.  Please increase physical activity to at least 30 minutes of vigorous activity 5 times per week.  Work on decreasing fat/cholesterol as well as carbs/sugar in your diet.     The 10-year ASCVD risk score (Emilio TOMLIN Jr., et al., 2013) is: 2.5%     Values used to calculate the score:       Age: 46 years       Sex: Female       Is Non- : No       Diabetic: No       Tobacco smoker: No       Systolic Blood Pressure: 124 mmHg       Is BP treated: No       HDL Cholesterol: 41 mg/dL       Total Cholesterol: 285 mg/dL       Resulted Orders   TSH with free T4 reflex   Result Value Ref Range    TSH 2.97 0.40 - 4.00 mU/L   Hemoglobin A1c   Result Value Ref Range    Hemoglobin A1C 5.7 (H) 0.0 - 5.6 %      Comment:      Normal <5.7%   Prediabetes 5.7-6.4%    Diabetes 6.5% or higher     Note: Adopted from ADA consensus guidelines.   Lipid panel reflex to direct LDL Non-fasting   Result Value Ref Range    Cholesterol 285 (H) <200 mg/dL    Triglycerides 589 (H) <150 mg/dL    Direct Measure HDL 41 (L) >=50 mg/dL    LDL Cholesterol Calculated        Comment:      Cannot estimate LDL when triglyceride exceeds 400 mg/dL    Non HDL Cholesterol 244 (H) <130 mg/dL    Patient Fasting > 8hrs? No     Narrative    Cholesterol  Desirable:  <200 mg/dL    Triglycerides  Normal:  Less than 150 mg/dL  Borderline High:  150-199 mg/dL  High:  200-499 mg/dL  Very High:  Greater than or equal to 500 mg/dL    Direct Measure HDL  Female:  Greater than or equal to 50 mg/dL   Male:  Greater  than or equal to 40 mg/dL    LDL Cholesterol  Desirable:  <100mg/dL  Above Desirable:  100-129 mg/dL   Borderline High:  130-159 mg/dL   High:  160-189 mg/dL   Very High:  >= 190 mg/dL    Non HDL Cholesterol  Desirable:  130 mg/dL  Above Desirable:  130-159 mg/dL  Borderline High:  160-189 mg/dL  High:  190-219 mg/dL  Very High:  Greater than or equal to 220 mg/dL   LDL cholesterol direct   Result Value Ref Range    LDL Cholesterol Direct 171 (H) <100 mg/dL      Comment:      Age 0-19 years:  Desirable: 0-110 mg/dL   Borderline high: 110-129 mg/dL   High: >= 130 mg/dL    Age 20 years and older:  Desirable: <100mg/dL  Above desirable: 100-129 mg/dL   Borderline high: 130-159 mg/dL   High: 160-189 mg/dL   Very high: >= 190 mg/dL       If you have any questions or concerns, please call the clinic at the number listed above.       Sincerely,      KALEB Aguilar CNP

## 2022-08-01 NOTE — PROGRESS NOTES
Assessment & Plan     Hypothyroidism, unspecified type  TSH normal without medication okay to continue off medication.   - TSH with free T4 reflex; Future  - TSH with free T4 reflex    Morbid obesity (H)  Significant weight gain recently, denies recent medication changes.  Recommend lifestyle changes to bring BMI down.   - Lipid panel reflex to direct LDL Non-fasting; Future  - Hemoglobin A1c; Future  - Hemoglobin A1c  - Lipid panel reflex to direct LDL Non-fasting  - LDL cholesterol direct    Elevated glucose  Cholesterol is significantly elevated as well as hemoglobin A1C is elevated at 5.7 putting her in the pre-diabetic range.  Her 10 year risk of a cardiovascular event is calculated at 2.5 % so we can wait on medication at this time but she does need to work on her lifestyle to bring these levels down.  Please increase physical activity to at least 30 minutes of vigorous activity 5 times per week.  Work on decreasing fat/cholesterol as well as carbs/sugar in your diet  - Hemoglobin A1c; Future  - Hemoglobin A1c    Hyperlipidemia LDL goal <160  Per above.   - Lipid panel reflex to direct LDL Non-fasting; Future  - Lipid panel reflex to direct LDL Non-fasting  - LDL cholesterol direct      Return in about 1 year (around 8/1/2023) for Routine Visit.    KALEB Aguilar [facility]    Cristina Lewis is a 46 year old accompanied by her group home staff, presenting for the following health issues:  Thyroid Problem      History of Present Illness       Reason for visit:  Labs    She eats 2-3 servings of fruits and vegetables daily.She consumes 1 sweetened beverage(s) daily.She exercises with enough effort to increase her heart rate 20 to 29 minutes per day.  She exercises with enough effort to increase her heart rate 4 days per week.   She is taking medications regularly.       Hypothyroidism Follow-up      Since last visit, patient describes the following symptoms: Weight  "stable, no hair loss, no skin changes, no constipation, no loose stools      Review of Systems   Constitutional, HEENT, cardiovascular, pulmonary, gi and gu systems are negative, except as otherwise noted.      Objective    /80 (Cuff Size: Adult Large)   Pulse 84   Temp 98.5  F (36.9  C) (Tympanic)   Resp 16   Ht 1.581 m (5' 2.25\")   Wt 112 kg (247 lb)   BMI 44.81 kg/m    Body mass index is 44.81 kg/m .  Physical Exam   GENERAL: healthy, alert and no distress  NECK: no adenopathy, no asymmetry, masses, or scars and thyroid normal to palpation  RESP: lungs clear to auscultation - no rales, rhonchi or wheezes  CV: regular rate and rhythm, normal S1 S2, no S3 or S4, no murmur  PSYCH: mentation appears normal, affect normal/bright    Results for orders placed or performed in visit on 08/01/22   TSH with free T4 reflex     Status: Normal   Result Value Ref Range    TSH 2.97 0.40 - 4.00 mU/L   Hemoglobin A1c     Status: Abnormal   Result Value Ref Range    Hemoglobin A1C 5.7 (H) 0.0 - 5.6 %   Lipid panel reflex to direct LDL Non-fasting     Status: Abnormal   Result Value Ref Range    Cholesterol 285 (H) <200 mg/dL    Triglycerides 589 (H) <150 mg/dL    Direct Measure HDL 41 (L) >=50 mg/dL    LDL Cholesterol Calculated      Non HDL Cholesterol 244 (H) <130 mg/dL    Patient Fasting > 8hrs? No     Narrative    Cholesterol  Desirable:  <200 mg/dL    Triglycerides  Normal:  Less than 150 mg/dL  Borderline High:  150-199 mg/dL  High:  200-499 mg/dL  Very High:  Greater than or equal to 500 mg/dL    Direct Measure HDL  Female:  Greater than or equal to 50 mg/dL   Male:  Greater than or equal to 40 mg/dL    LDL Cholesterol  Desirable:  <100mg/dL  Above Desirable:  100-129 mg/dL   Borderline High:  130-159 mg/dL   High:  160-189 mg/dL   Very High:  >= 190 mg/dL    Non HDL Cholesterol  Desirable:  130 mg/dL  Above Desirable:  130-159 mg/dL  Borderline High:  160-189 mg/dL  High:  190-219 mg/dL  Very High:  Greater than " or equal to 220 mg/dL   LDL cholesterol direct     Status: Abnormal   Result Value Ref Range    LDL Cholesterol Direct 171 (H) <100 mg/dL               .  ..

## 2022-08-03 DIAGNOSIS — K59.00 CONSTIPATION, UNSPECIFIED CONSTIPATION TYPE: Primary | ICD-10-CM

## 2022-08-03 RX ORDER — DOCUSATE SODIUM 100 MG/1
CAPSULE, LIQUID FILLED ORAL
Qty: 28 CAPSULE | Refills: 11 | Status: SHIPPED | OUTPATIENT
Start: 2022-08-03 | End: 2022-09-13

## 2022-09-13 ENCOUNTER — TELEPHONE (OUTPATIENT)
Dept: FAMILY MEDICINE | Facility: CLINIC | Age: 47
End: 2022-09-13

## 2022-10-11 ENCOUNTER — TELEPHONE (OUTPATIENT)
Dept: FAMILY MEDICINE | Facility: CLINIC | Age: 47
End: 2022-10-11

## 2022-10-11 ENCOUNTER — ALLIED HEALTH/NURSE VISIT (OUTPATIENT)
Dept: FAMILY MEDICINE | Facility: CLINIC | Age: 47
End: 2022-10-11
Payer: MEDICARE

## 2022-10-11 DIAGNOSIS — F99 MENTAL HEALTH DISORDER: Primary | ICD-10-CM

## 2022-10-11 PROCEDURE — 99207 PR NO CHARGE NURSE ONLY: CPT

## 2022-10-11 NOTE — TELEPHONE ENCOUNTER
Debbie was scheduled for a prolixin shot today and every 2 weeks but we do not have any orders for this. She will have her psychiatrist fax the order and chart notes so we can get this ordered. We will call her when we get the orders and have the medication in clinic

## 2023-05-31 ENCOUNTER — OFFICE VISIT (OUTPATIENT)
Dept: FAMILY MEDICINE | Facility: CLINIC | Age: 48
End: 2023-05-31
Payer: MEDICARE

## 2023-05-31 VITALS
HEART RATE: 94 BPM | DIASTOLIC BLOOD PRESSURE: 84 MMHG | BODY MASS INDEX: 44.2 KG/M2 | WEIGHT: 275 LBS | SYSTOLIC BLOOD PRESSURE: 132 MMHG | TEMPERATURE: 98.7 F | HEIGHT: 66 IN | RESPIRATION RATE: 16 BRPM | OXYGEN SATURATION: 97 %

## 2023-05-31 DIAGNOSIS — E03.9 HYPOTHYROIDISM, UNSPECIFIED TYPE: ICD-10-CM

## 2023-05-31 DIAGNOSIS — E78.5 HYPERLIPIDEMIA LDL GOAL <160: ICD-10-CM

## 2023-05-31 DIAGNOSIS — R39.89 URINARY PROBLEM: ICD-10-CM

## 2023-05-31 DIAGNOSIS — Z00.00 ENCOUNTER FOR MEDICARE ANNUAL WELLNESS EXAM: Primary | ICD-10-CM

## 2023-05-31 DIAGNOSIS — E66.01 MORBID OBESITY (H): ICD-10-CM

## 2023-05-31 DIAGNOSIS — Z12.31 VISIT FOR SCREENING MAMMOGRAM: ICD-10-CM

## 2023-05-31 DIAGNOSIS — R73.03 PREDIABETES: ICD-10-CM

## 2023-05-31 DIAGNOSIS — Z12.11 SCREEN FOR COLON CANCER: ICD-10-CM

## 2023-05-31 DIAGNOSIS — Z11.4 SCREENING FOR HIV (HUMAN IMMUNODEFICIENCY VIRUS): ICD-10-CM

## 2023-05-31 LAB
ALBUMIN SERPL BCG-MCNC: 4.5 G/DL (ref 3.5–5.2)
ALBUMIN UR-MCNC: NEGATIVE MG/DL
ALP SERPL-CCNC: 93 U/L (ref 35–104)
ALT SERPL W P-5'-P-CCNC: 42 U/L (ref 10–35)
ANION GAP SERPL CALCULATED.3IONS-SCNC: 10 MMOL/L (ref 7–15)
APPEARANCE UR: CLEAR
AST SERPL W P-5'-P-CCNC: 48 U/L (ref 10–35)
BILIRUB SERPL-MCNC: <0.2 MG/DL
BILIRUB UR QL STRIP: NEGATIVE
BUN SERPL-MCNC: 13.7 MG/DL (ref 6–20)
CALCIUM SERPL-MCNC: 10.4 MG/DL (ref 8.6–10)
CHLORIDE SERPL-SCNC: 102 MMOL/L (ref 98–107)
CHOLEST SERPL-MCNC: 271 MG/DL
COLOR UR AUTO: YELLOW
CREAT SERPL-MCNC: 0.7 MG/DL (ref 0.51–0.95)
DEPRECATED HCO3 PLAS-SCNC: 26 MMOL/L (ref 22–29)
ERYTHROCYTE [DISTWIDTH] IN BLOOD BY AUTOMATED COUNT: 13.7 % (ref 10–15)
GFR SERPL CREATININE-BSD FRML MDRD: >90 ML/MIN/1.73M2
GLUCOSE SERPL-MCNC: 102 MG/DL (ref 70–99)
GLUCOSE UR STRIP-MCNC: NEGATIVE MG/DL
HBA1C MFR BLD: 5.5 % (ref 0–5.6)
HCT VFR BLD AUTO: 38.9 % (ref 35–47)
HDLC SERPL-MCNC: 45 MG/DL
HGB BLD-MCNC: 12.7 G/DL (ref 11.7–15.7)
HGB UR QL STRIP: NEGATIVE
KETONES UR STRIP-MCNC: NEGATIVE MG/DL
LDLC SERPL CALC-MCNC: ABNORMAL MG/DL
LEUKOCYTE ESTERASE UR QL STRIP: NEGATIVE
MCH RBC QN AUTO: 29.6 PG (ref 26.5–33)
MCHC RBC AUTO-ENTMCNC: 32.6 G/DL (ref 31.5–36.5)
MCV RBC AUTO: 91 FL (ref 78–100)
NITRATE UR QL: NEGATIVE
NONHDLC SERPL-MCNC: 226 MG/DL
PH UR STRIP: 7 [PH] (ref 5–7)
PLATELET # BLD AUTO: 235 10E3/UL (ref 150–450)
POTASSIUM SERPL-SCNC: 4.3 MMOL/L (ref 3.4–5.3)
PROT SERPL-MCNC: 7.6 G/DL (ref 6.4–8.3)
RBC # BLD AUTO: 4.29 10E6/UL (ref 3.8–5.2)
SODIUM SERPL-SCNC: 138 MMOL/L (ref 136–145)
SP GR UR STRIP: 1.02 (ref 1–1.03)
TRIGL SERPL-MCNC: 515 MG/DL
TSH SERPL DL<=0.005 MIU/L-ACNC: 2.64 UIU/ML (ref 0.3–4.2)
UROBILINOGEN UR STRIP-ACNC: 0.2 E.U./DL
WBC # BLD AUTO: 7.5 10E3/UL (ref 4–11)

## 2023-05-31 PROCEDURE — G0439 PPPS, SUBSEQ VISIT: HCPCS | Performed by: NURSE PRACTITIONER

## 2023-05-31 PROCEDURE — 87389 HIV-1 AG W/HIV-1&-2 AB AG IA: CPT | Performed by: NURSE PRACTITIONER

## 2023-05-31 PROCEDURE — 83721 ASSAY OF BLOOD LIPOPROTEIN: CPT | Mod: 59 | Performed by: NURSE PRACTITIONER

## 2023-05-31 PROCEDURE — 83036 HEMOGLOBIN GLYCOSYLATED A1C: CPT | Performed by: NURSE PRACTITIONER

## 2023-05-31 PROCEDURE — 99214 OFFICE O/P EST MOD 30 MIN: CPT | Mod: 25 | Performed by: NURSE PRACTITIONER

## 2023-05-31 PROCEDURE — 85027 COMPLETE CBC AUTOMATED: CPT | Performed by: NURSE PRACTITIONER

## 2023-05-31 PROCEDURE — 84443 ASSAY THYROID STIM HORMONE: CPT | Performed by: NURSE PRACTITIONER

## 2023-05-31 PROCEDURE — 81003 URINALYSIS AUTO W/O SCOPE: CPT | Performed by: NURSE PRACTITIONER

## 2023-05-31 PROCEDURE — 36415 COLL VENOUS BLD VENIPUNCTURE: CPT | Performed by: NURSE PRACTITIONER

## 2023-05-31 PROCEDURE — 80053 COMPREHEN METABOLIC PANEL: CPT | Performed by: NURSE PRACTITIONER

## 2023-05-31 PROCEDURE — 80061 LIPID PANEL: CPT | Performed by: NURSE PRACTITIONER

## 2023-05-31 ASSESSMENT — PAIN SCALES - GENERAL: PAINLEVEL: NO PAIN (0)

## 2023-05-31 NOTE — PATIENT INSTRUCTIONS
Patient Education   Personalized Prevention Plan  You are due for the preventive services outlined below.  Your care team is available to assist you in scheduling these services.  If you have already completed any of these items, please share that information with your care team to update in your medical record.  Health Maintenance Due   Topic Date Due     ANNUAL REVIEW OF HM ORDERS  Never done     Colorectal Cancer Screening  Never done     HIV Screening  Never done     Hepatitis C Screening  Never done     Hepatitis B Vaccine (2 of 3 - 19+ 3-dose series) 09/04/2017     COVID-19 Vaccine (4 - Moderna series) 01/16/2022     Flu Vaccine (1) 09/01/2022     Mammogram  11/10/2022     Annual Wellness Visit  03/21/2023     Your Health Risk Assessment indicates you feel you are not in good health    A healthy lifestyle helps keep the body fit and the mind alert. It helps protect you from disease, helps you fight disease, and helps prevent chronic disease (disease that doesn't go away) from getting worse. This is important as you get older and begin to notice twinges in muscles and joints and a decline in the strength and stamina you once took for granted. A healthy lifestyle includes good healthcare, good nutrition, weight control, recreation, and regular exercise. Avoid harmful substances and do what you can to keep safe. Another part of a healthy lifestyle is stay mentally active and socially involved.    Good healthcare     Have a wellness visit every year.     If you have new symptoms, let us know right away. Don't wait until the next checkup.     Take medicines exactly as prescribed and keep your medicines in a safe place. Tell us if your medicine causes problems.   Healthy diet and weight control     Eat 3 or 4 small, nutritious, low-fat, high-fiber meals a day. Include a variety of fruits, vegetables, and whole-grain foods.     Make sure you get enough calcium in your diet. Calcium, vitamin D, and exercise help  prevent osteoporosis (bone thinning).     If you live alone, try eating with others when you can. That way you get a good meal and have company while you eat it.     Try to keep a healthy weight. If you eat more calories than your body uses for energy, it will be stored as fat and you will gain weight.     Recreation   Recreation is not limited to sports and team events. It includes any activity that provides relaxation, interest, enjoyment, and exercise. Recreation provides an outlet for physical, mental, and social energy. It can give a sense of worth and achievement. It can help you stay healthy.    Mental Exercise and Social Involvement  Mental and emotional health is as important as physical health. Keep in touch with friends and family. Stay as active as possible. Continue to learn and challenge yourself.   Things you can do to stay mentally active are:    Learn something new, like a foreign language or musical instrument.     Play SCRABBLE or do crossword puzzles. If you cannot find people to play these games with you at home, you can play them with others on your computer through the Internet.     Join a games club--anything from card games to chess or checkers or lawn bowling.     Start a new hobby.     Go back to school.     Volunteer.     Read.   Keep up with world events.    Understanding USDA MyPlate  The USDA has guidelines to help you make healthy food choices. These are called MyPlate. MyPlate shows the food groups that make up healthy meals using the image of a place setting. Before you eat, think about the healthiest choices for what to put on your plate or in your cup or bowl. To learn more about building a healthy plate, visit www.choosemyplate.gov.     The food groups    Fruits. Any fruit or 100% fruit juice counts as part of the Fruit Group. Fruits may be fresh, canned, frozen, or dried, and may be whole, cut-up, or pureed. Make 1/2 of your plate fruits and vegetables.    Vegetables. Any  vegetable or 100% vegetable juice counts as a member of the Vegetable Group. Vegetables may be fresh, frozen, canned, or dried. They can be served raw or cooked and may be whole, cut-up, or mashed. Make 1/2 of your plate fruits and vegetables.    Grains. All foods made from grains are part of the Grains Group. These include wheat, rice, oats, cornmeal, and barley. Grains are often used to make foods such as bread, pasta, oatmeal, cereal, tortillas, and grits. Grains should be no more than 1/4 of your plate. At least half of your grains should be whole grains.    Protein. This group includes meat, poultry, seafood, beans and peas, eggs, processed soy products (such as tofu), nuts (including nut butters), and seeds. Make protein choices no more than 1/4 of your plate. Meat and poultry choices should be lean or low fat.    Dairy. The Dairy Group includes all fluid milk products and foods made from milk that contain calcium, such as yogurt and cheese. (Foods that have little calcium, such as cream, butter, and cream cheese, are not part of this group.) Most dairy choices should be low-fat or fat-free.    Oils. Oils aren't a food group, but they do contain essential nutrients. However it's important to watch your intake of oils. These are fats that are liquid at room temperature. They include canola, corn, olive, soybean, vegetable, and sunflower oil. Foods that are mainly oil include mayonnaise, certain salad dressings, and soft margarines. You likely already get your daily oil allowance from the foods you eat.  Things to limit  Eating healthy also means limiting these things in your diet:    Salt (sodium). Many processed foods have a lot of sodium. To keep sodium intake down, eat fresh vegetables, meats, poultry, and seafood when possible. Purchase low-sodium, reduced-sodium, or no-salt-added food products at the store. And don't add salt to your meals at home. Instead, season them with herbs and spices such as dill,  oregano, cumin, and paprika. Or try adding flavor with lemon or lime zest and juice.    Saturated fat. Saturated fats are most often found in animal products such as beef, pork, and chicken. They are often solid at room temperature, such as butter. To reduce your saturated fat intake, choose leaner cuts of meat and poultry. And try healthier cooking methods such as grilling, broiling, roasting, or baking. For a simple lower-fat swap, use plain nonfat yogurt instead of mayonnaise when making potato salad or macaroni salad.    Added sugars. These are sugars added to foods. They are in foods such as ice cream, candy, soda, fruit drinks, sports drinks, energy drinks, cookies, pastries, jams, and syrups. Cut down on added sugars by sharing sweet treats with a family member or friend. You can also choose fruit for dessert, and drink water or other unsweetened beverages.  dINK last reviewed this educational content on 6/1/2020 2000-2022 [employer]. All rights reserved. This information is not intended as a substitute for professional medical care. Always follow your healthcare professional's instructions.        Your Health Risk Assessment indicates you feel you are not in good emotional health.    Recreation   Recreation is not limited to sports and team events. It includes any activity that provides relaxation, interest, enjoyment, and exercise. Recreation provides an outlet for physical, mental, and social energy. It can give a sense of worth and achievement. It can help you stay healthy.    Mental Exercise and Social Involvement  Mental and emotional health is as important as physical health. Keep in touch with friends and family. Stay as active as possible. Continue to learn and challenge yourself.   Things you can do to stay mentally active are:    Learn something new, like a foreign language or musical instrument.     Play SCRABBLE or do crossword puzzles. If you cannot find people to play  these games with you at home, you can play them with others on your computer through the Internet.     Join a games club--anything from card games to chess or checkers or lawn bowling.     Start a new hobby.     Go back to school.     Volunteer.     Read.   Keep up with world events.

## 2023-05-31 NOTE — LETTER
June 1, 2023      Debbie Smalls  [address] [address]        Dear ,    We are writing to inform you of your test results.  Labs look good with the exception of your cholesterol which was again significantly elevated.  Your liver enzymes were also slightly elevated. Your 10-year risk of a cardiovascular event is calculated at 2.4% so no medication indicated at this time but you should work on lifestyle to bring these levels down.  Please increase physical activity to at least 30 minutes of vigorous activity 5 times per week.  Work on decreasing fat/cholesterol in your diet to improve cholesterol and liver enzymes.         Resulted Orders   UA Macroscopic with reflex to Microscopic and Culture   Result Value Ref Range    Color Urine Yellow Colorless, Straw, Light Yellow, Yellow    Appearance Urine Clear Clear    Glucose Urine Negative Negative mg/dL    Bilirubin Urine Negative Negative    Ketones Urine Negative Negative mg/dL    Specific Gravity Urine 1.020 1.003 - 1.035    Blood Urine Negative Negative    pH Urine 7.0 5.0 - 7.0    Protein Albumin Urine Negative Negative mg/dL    Urobilinogen Urine 0.2 0.2, 1.0 E.U./dL    Nitrite Urine Negative Negative    Leukocyte Esterase Urine Negative Negative    Narrative    Microscopic not indicated   CBC with platelets   Result Value Ref Range    WBC Count 7.5 4.0 - 11.0 10e3/uL    RBC Count 4.29 3.80 - 5.20 10e6/uL    Hemoglobin 12.7 11.7 - 15.7 g/dL    Hematocrit 38.9 35.0 - 47.0 %    MCV 91 78 - 100 fL    MCH 29.6 26.5 - 33.0 pg    MCHC 32.6 31.5 - 36.5 g/dL    RDW 13.7 10.0 - 15.0 %    Platelet Count 235 150 - 450 10e3/uL   TSH with free T4 reflex   Result Value Ref Range    TSH 2.64 0.30 - 4.20 uIU/mL   Comprehensive metabolic panel (BMP + Alb, Alk Phos, ALT, AST, Total. Bili, TP)   Result Value Ref Range    Sodium 138 136 - 145 mmol/L    Potassium 4.3 3.4 - 5.3 mmol/L    Chloride 102 98 - 107 mmol/L    Carbon Dioxide (CO2) 26 22 - 29  mmol/L    Anion Gap 10 7 - 15 mmol/L    Urea Nitrogen 13.7 6.0 - 20.0 mg/dL    Creatinine 0.70 0.51 - 0.95 mg/dL    Calcium 10.4 (H) 8.6 - 10.0 mg/dL    Glucose 102 (H) 70 - 99 mg/dL    Alkaline Phosphatase 93 35 - 104 U/L    AST 48 (H) 10 - 35 U/L    ALT 42 (H) 10 - 35 U/L    Protein Total 7.6 6.4 - 8.3 g/dL    Albumin 4.5 3.5 - 5.2 g/dL    Bilirubin Total <0.2 <=1.2 mg/dL    GFR Estimate >90 >60 mL/min/1.73m2      Comment:      eGFR calculated using 2021 CKD-EPI equation.   Lipid panel reflex to direct LDL Non-fasting   Result Value Ref Range    Cholesterol 271 (H) <200 mg/dL    Triglycerides 515 (H) <150 mg/dL    Direct Measure HDL 45 (L) >=50 mg/dL    LDL Cholesterol Calculated        Comment:      Cannot estimate LDL when triglyceride exceeds 400 mg/dL    Non HDL Cholesterol 226 (H) <130 mg/dL    Narrative    Cholesterol  Desirable:  <200 mg/dL    Triglycerides  Normal:  Less than 150 mg/dL  Borderline High:  150-199 mg/dL  High:  200-499 mg/dL  Very High:  Greater than or equal to 500 mg/dL    Direct Measure HDL  Female:  Greater than or equal to 50 mg/dL   Male:  Greater than or equal to 40 mg/dL    LDL Cholesterol  Desirable:  <100mg/dL  Above Desirable:  100-129 mg/dL   Borderline High:  130-159 mg/dL   High:  160-189 mg/dL   Very High:  >= 190 mg/dL    Non HDL Cholesterol  Desirable:  130 mg/dL  Above Desirable:  130-159 mg/dL  Borderline High:  160-189 mg/dL  High:  190-219 mg/dL  Very High:  Greater than or equal to 220 mg/dL   Hemoglobin A1c   Result Value Ref Range    Hemoglobin A1C 5.5 0.0 - 5.6 %      Comment:      Normal <5.7%   Prediabetes 5.7-6.4%    Diabetes 6.5% or higher     Note: Adopted from ADA consensus guidelines.   HIV Antigen Antibody Combo   Result Value Ref Range    HIV Antigen Antibody Combo Nonreactive Nonreactive      Comment:      HIV-1 p24 Ag & HIV-1/HIV-2 Ab Not Detected   LDL cholesterol direct   Result Value Ref Range    LDL Cholesterol Direct 159 (H) <100 mg/dL      Comment:       Age 2-19 years:  Desirable: 0-110 mg/dL   Borderline high: 110-129 mg/dL   High: >= 130 mg/dL    Age 20 years and older:  Desirable: <100mg/dL  Above desirable: 100-129 mg/dL   Borderline high: 130-159 mg/dL   High: 160-189 mg/dL   Very high: >= 190 mg/dL       If you have any questions or concerns, please call the clinic at the number listed above.       Sincerely,      KALEB Aguilar CNP/dw

## 2023-06-01 LAB
HIV 1+2 AB+HIV1 P24 AG SERPL QL IA: NONREACTIVE
LDLC SERPL DIRECT ASSAY-MCNC: 159 MG/DL

## 2023-06-26 ENCOUNTER — ANCILLARY PROCEDURE (OUTPATIENT)
Dept: MAMMOGRAPHY | Facility: CLINIC | Age: 48
End: 2023-06-26
Attending: NURSE PRACTITIONER
Payer: MEDICARE

## 2023-06-26 DIAGNOSIS — Z12.31 VISIT FOR SCREENING MAMMOGRAM: ICD-10-CM

## 2023-06-26 PROCEDURE — 77063 BREAST TOMOSYNTHESIS BI: CPT | Mod: TC | Performed by: RADIOLOGY

## 2023-06-26 PROCEDURE — 77067 SCR MAMMO BI INCL CAD: CPT | Mod: TC | Performed by: RADIOLOGY

## 2023-07-20 ENCOUNTER — OFFICE VISIT (OUTPATIENT)
Dept: FAMILY MEDICINE | Facility: CLINIC | Age: 48
End: 2023-07-20
Payer: MEDICARE

## 2023-07-20 VITALS
HEIGHT: 66 IN | TEMPERATURE: 98 F | WEIGHT: 277.2 LBS | BODY MASS INDEX: 44.55 KG/M2 | HEART RATE: 92 BPM | RESPIRATION RATE: 20 BRPM | SYSTOLIC BLOOD PRESSURE: 124 MMHG | DIASTOLIC BLOOD PRESSURE: 80 MMHG | OXYGEN SATURATION: 98 %

## 2023-07-20 DIAGNOSIS — Z12.11 SCREEN FOR COLON CANCER: ICD-10-CM

## 2023-07-20 DIAGNOSIS — F20.0 PARANOID SCHIZOPHRENIA (H): Primary | ICD-10-CM

## 2023-07-20 PROCEDURE — 99213 OFFICE O/P EST LOW 20 MIN: CPT | Performed by: NURSE PRACTITIONER

## 2023-07-20 RX ORDER — FLUPHENAZINE DECANOATE 25 MG/ML
12.5 INJECTION, SOLUTION INTRAMUSCULAR; SUBCUTANEOUS
Qty: 5 ML | Refills: 2 | Status: SHIPPED | OUTPATIENT
Start: 2023-07-20

## 2023-07-20 RX ORDER — QUETIAPINE FUMARATE 400 MG/1
400 TABLET, FILM COATED ORAL AT BEDTIME
Qty: 30 TABLET | Refills: 2 | Status: SHIPPED | OUTPATIENT
Start: 2023-07-20 | End: 2024-09-17

## 2023-07-20 RX ORDER — FLUPHENAZINE DECANOATE 25 MG/ML
INJECTION, SOLUTION INTRAMUSCULAR; SUBCUTANEOUS
Status: CANCELLED | OUTPATIENT
Start: 2023-07-20

## 2023-07-20 ASSESSMENT — ANXIETY QUESTIONNAIRES
4. TROUBLE RELAXING: SEVERAL DAYS
3. WORRYING TOO MUCH ABOUT DIFFERENT THINGS: MORE THAN HALF THE DAYS
5. BEING SO RESTLESS THAT IT IS HARD TO SIT STILL: SEVERAL DAYS
GAD7 TOTAL SCORE: 11
6. BECOMING EASILY ANNOYED OR IRRITABLE: MORE THAN HALF THE DAYS
7. FEELING AFRAID AS IF SOMETHING AWFUL MIGHT HAPPEN: MORE THAN HALF THE DAYS
GAD7 TOTAL SCORE: 11
IF YOU CHECKED OFF ANY PROBLEMS ON THIS QUESTIONNAIRE, HOW DIFFICULT HAVE THESE PROBLEMS MADE IT FOR YOU TO DO YOUR WORK, TAKE CARE OF THINGS AT HOME, OR GET ALONG WITH OTHER PEOPLE: SOMEWHAT DIFFICULT
2. NOT BEING ABLE TO STOP OR CONTROL WORRYING: MORE THAN HALF THE DAYS
1. FEELING NERVOUS, ANXIOUS, OR ON EDGE: SEVERAL DAYS

## 2023-07-20 ASSESSMENT — PAIN SCALES - GENERAL: PAINLEVEL: NO PAIN (0)

## 2023-07-20 ASSESSMENT — PATIENT HEALTH QUESTIONNAIRE - PHQ9
SUM OF ALL RESPONSES TO PHQ QUESTIONS 1-9: 6
10. IF YOU CHECKED OFF ANY PROBLEMS, HOW DIFFICULT HAVE THESE PROBLEMS MADE IT FOR YOU TO DO YOUR WORK, TAKE CARE OF THINGS AT HOME, OR GET ALONG WITH OTHER PEOPLE: SOMEWHAT DIFFICULT
SUM OF ALL RESPONSES TO PHQ QUESTIONS 1-9: 6

## 2023-07-20 NOTE — PROGRESS NOTES
Assessment & Plan     Paranoid schizophrenia (H)  Stable. In transition, mental health provider not available for an undetermined amount of time.  Interested in switching the Prolixin due to possible side effects but this should be done by psychiatry.  Will refill medications until she can get in with psychiatry, referral placed.   - QUEtiapine (SEROQUEL) 400 MG tablet; Take 1 tablet (400 mg) by mouth At Bedtime  - Adult Mental Health  Referral; Future  - fluPHENAZine decanoate (PROLIXIN) 25 MG/ML injection; Inject 0.5 mLs (12.5 mg) into the muscle every 28 (twenty-eight) days q 4 weeks    Screen for colon cancer    - Colonoscopy Screening  Referral; Future      KALEB Aguilar [facility]    Cristina Lewis is a 47 year old, presenting for the following health issues:  Mental Health Problem and Establish Care        7/20/2023     8:30 AM   Additional Questions   Roomed by Janis CROWLEY CMA     History of Present Illness       Reason for visit:  To inquire if Jacquelyn Garrett could be my mental health provider-Patient states she has been trying to get ahold of her mental health provider for several months and has not heard anything    She eats 2-3 servings of fruits and vegetables daily.She consumes 1 sweetened beverage(s) daily.She exercises with enough effort to increase her heart rate 10 to 19 minutes per day.  She exercises with enough effort to increase her heart rate 3 or less days per week.   She is taking medications regularly.    Today's PHQ-9         PHQ-9 Total Score: 6    PHQ-9 Q9 Thoughts of better off dead/self-harm past 2 weeks :   Not at all    How difficult have these problems made it for you to do your work, take care of things at home, or get along with other people: Somewhat difficult  Today's PALMA-7 Score: 11     -Patient also states that she has been more stiff the last 6 months or so.  She spoke with her pharmacist regarding this, and the  "pharmacist told her it could be related to the Prolixin injection.  She is wondering if she can lower the dose of the injection.     Review of Systems   Constitutional, HEENT, cardiovascular, pulmonary, gi and gu systems are negative, except as otherwise noted.      Objective    /80 (BP Location: Right arm, Patient Position: Sitting, Cuff Size: Adult Large)   Pulse 92   Temp 98  F (36.7  C) (Tympanic)   Resp 20   Ht 1.683 m (5' 6.25\")   Wt 125.7 kg (277 lb 3.2 oz)   LMP 06/10/2023   SpO2 98%   BMI 44.40 kg/m    Body mass index is 44.4 kg/m .  Physical Exam   GENERAL: healthy, alert and no distress  PSYCH: mentation appears normal, affect normal/bright              "

## 2024-08-15 ENCOUNTER — TELEPHONE (OUTPATIENT)
Dept: FAMILY MEDICINE | Facility: CLINIC | Age: 49
End: 2024-08-15

## 2024-08-15 NOTE — TELEPHONE ENCOUNTER
Form was faxed on physical exam form a 8/8/24 and standing orders form     They faxed to [phone].  Transferred to [facility] [place].    Rachel WEINSTEIN RN

## 2024-08-29 ENCOUNTER — OFFICE VISIT (OUTPATIENT)
Dept: FAMILY MEDICINE | Facility: CLINIC | Age: 49
End: 2024-08-29
Payer: MEDICARE

## 2024-08-29 VITALS
TEMPERATURE: 98 F | HEIGHT: 66 IN | BODY MASS INDEX: 40.5 KG/M2 | HEART RATE: 92 BPM | DIASTOLIC BLOOD PRESSURE: 84 MMHG | OXYGEN SATURATION: 96 % | WEIGHT: 252 LBS | RESPIRATION RATE: 20 BRPM | SYSTOLIC BLOOD PRESSURE: 128 MMHG

## 2024-08-29 DIAGNOSIS — E03.9 HYPOTHYROIDISM, UNSPECIFIED TYPE: ICD-10-CM

## 2024-08-29 DIAGNOSIS — F20.0 PARANOID SCHIZOPHRENIA (H): ICD-10-CM

## 2024-08-29 DIAGNOSIS — Z09 HOSPITAL DISCHARGE FOLLOW-UP: Primary | ICD-10-CM

## 2024-08-29 DIAGNOSIS — E78.5 HYPERLIPIDEMIA LDL GOAL <160: ICD-10-CM

## 2024-08-29 DIAGNOSIS — Z11.59 NEED FOR HEPATITIS C SCREENING TEST: ICD-10-CM

## 2024-08-29 DIAGNOSIS — E66.01 MORBID OBESITY (H): ICD-10-CM

## 2024-08-29 DIAGNOSIS — R73.03 PREDIABETES: ICD-10-CM

## 2024-08-29 PROCEDURE — 99214 OFFICE O/P EST MOD 30 MIN: CPT | Performed by: NURSE PRACTITIONER

## 2024-08-29 PROCEDURE — G2211 COMPLEX E/M VISIT ADD ON: HCPCS | Performed by: NURSE PRACTITIONER

## 2024-08-29 RX ORDER — HYDROXYZINE PAMOATE 25 MG/1
CAPSULE ORAL
COMMUNITY
Start: 2024-08-24

## 2024-08-29 RX ORDER — LEVOTHYROXINE SODIUM 75 UG/1
75 TABLET ORAL
COMMUNITY
Start: 2024-08-08 | End: 2024-09-17

## 2024-08-29 RX ORDER — TRAZODONE HYDROCHLORIDE 50 MG/1
50 TABLET, FILM COATED ORAL
COMMUNITY
Start: 2024-08-07

## 2024-08-29 ASSESSMENT — PAIN SCALES - GENERAL: PAINLEVEL: NO PAIN (0)

## 2024-08-29 NOTE — PROGRESS NOTES
Assessment & Plan     Hospital discharge follow-up  Doing well since discharge, back at group home and following with psychiatry     Paranoid schizophrenia (H)  Per above.     Hypothyroidism, unspecified type  Feels like she is having to many side effects from levothyroxine has stopped taking.  Discussion on benefits of medication, Debbie prefers to continue off for 6 weeks and recheck labs at that time.  - TSH WITH FREE T4 REFLEX; Future    Hyperlipidemia LDL goal <160  Plan recheck of labs with recheck of thyroid  - Lipid panel reflex to direct LDL Fasting; Future    Prediabetes  Per above.  - Hemoglobin A1c; Future    Morbid obesity (H)  BMI elevated at 40 with comorbid hyperlipidemia and prediabetes, lifestyle recommendations discussed to bring BMI down and improve comorbid conditions.  Using    Need for hepatitis C screening test    - Hepatitis C Screen Reflex to HCV RNA Quant and Genotype; Future        MED REC REQUIRED  Post Medication Reconciliation Status: discharge medications reconciled and changed, per note/orders    Subjective   Debbie is a 48 year old, presenting for the following health issues:  Hospital F/U        8/29/2024     8:40 AM   Additional Questions   Roomed by Laureen VELAZQUEZ   Accompanied by self         8/29/2024     8:40 AM   Patient Reported Additional Medications   Patient reports taking the following new medications no new meds     HPI     Discuss levothyroxine- possible side effects- feels like it is extending period and having excessive weight gain. Stopped taking 2 days ago. Discuss Invega.    Hospital Follow-up Visit:    Hospital/Nursing Home/IP Rehab Facility:  [facility]   Date of Admission: 06/11/24  Date of Discharge: 08/08/24  Reason(s) for Admission: hearing voices   Was the patient in the ICU or did the patient experience delirium during hospitalization?  No  Do you have any other stressors you would like to discuss with your provider? No    Problems taking medications  "regularly:  sometimes   Medication changes since discharge: None  Problems adhering to non-medication therapy:  None    Summary of hospitalization:  CareEverywhere information obtained and reviewed  Diagnostic Tests/Treatments reviewed.  Follow up needed: none  Other Healthcare Providers Involved in Patient s Care:         Specialist appointment - Trista Rojas out of Jackie Quinonez's office in [place]  Update since discharge: improved.     Plan of care communicated with patient     Review of Systems  Constitutional, HEENT, cardiovascular, pulmonary, gi and gu systems are negative, except as otherwise noted.      Objective    /84   Pulse 92   Temp 98  F (36.7  C) (Tympanic)   Resp 20   Ht 1.683 m (5' 6.25\")   Wt 114.3 kg (252 lb)   LMP 08/21/2024   SpO2 96%   BMI 40.37 kg/m    Body mass index is 40.37 kg/m .  Physical Exam   GENERAL: alert and no distress  HENT: ear canals and TM's normal, nose and mouth without ulcers or lesions  NECK: no adenopathy, no asymmetry, masses, or scars  RESP: lungs clear to auscultation - no rales, rhonchi or wheezes  CV: regular rate and rhythm, normal S1 S2, no S3 or S4, no murmur, click or rub, no peripheral edema  ABDOMEN: soft, nontender, no hepatosplenomegaly, no masses and bowel sounds normal  PSYCH: mentation appears normal, affect normal/bright            Signed Electronically by: KALEB Aguilar CNP    "

## 2024-09-17 ENCOUNTER — OFFICE VISIT (OUTPATIENT)
Dept: FAMILY MEDICINE | Facility: CLINIC | Age: 49
End: 2024-09-17
Payer: MEDICARE

## 2024-09-17 VITALS
SYSTOLIC BLOOD PRESSURE: 126 MMHG | RESPIRATION RATE: 20 BRPM | HEIGHT: 66 IN | DIASTOLIC BLOOD PRESSURE: 88 MMHG | HEART RATE: 88 BPM | BODY MASS INDEX: 40.08 KG/M2 | WEIGHT: 249.4 LBS | TEMPERATURE: 98.3 F | OXYGEN SATURATION: 98 %

## 2024-09-17 DIAGNOSIS — Z00.00 ENCOUNTER FOR MEDICARE ANNUAL WELLNESS EXAM: Primary | ICD-10-CM

## 2024-09-17 DIAGNOSIS — Z12.11 SCREEN FOR COLON CANCER: ICD-10-CM

## 2024-09-17 PROCEDURE — G0439 PPPS, SUBSEQ VISIT: HCPCS | Performed by: NURSE PRACTITIONER

## 2024-09-17 RX ORDER — QUETIAPINE FUMARATE 300 MG/1
300 TABLET, FILM COATED ORAL
COMMUNITY
Start: 2024-09-17

## 2024-09-17 ASSESSMENT — PAIN SCALES - GENERAL: PAINLEVEL: NO PAIN (0)

## 2024-09-17 NOTE — PROGRESS NOTES
Preventive Care Visit  [facility]  KALEB Aguilar CNP, Family Medicine  Sep 17, 2024    Assessment & Plan     Encounter for Medicare annual wellness exam  No concerns today, plan lab only appointment in 4 weeks for labs    Screen for colon cancer    - Colonoscopy Screening  Referral; Future      Counseling  Appropriate preventive services were addressed with this patient via screening, questionnaire, or discussion as appropriate for fall prevention, nutrition, physical activity, Tobacco-use cessation, social engagement, weight loss and cognition.  Checklist reviewing preventive services available has been given to the patient.  Reviewed patient's diet, addressing concerns and/or questions.   The patient was instructed to see the dentist every 6 months.   Updated plan of care.  Patient reported difficulty with activities of daily living were addressed today.      Cristina Lewis is a 48 year old, presenting for the following:  Physical        9/17/2024    12:30 PM   Additional Questions   Roomed by Janis CROWLEY CMA       Health Care Directive  Patient does not have a Health Care Directive or Living Will: Discussed advance care planning with patient; however, patient declined at this time.    HPI        9/17/2024   General Health   How would you rate your overall physical health? Good   Feel stress (tense, anxious, or unable to sleep) To some extent      (!) STRESS CONCERN      9/17/2024   Nutrition   Diet: Carbohydrate counting            3/21/2022   Exercise   Frequency of exercise: 2-3 days/week            9/17/2024   Social Factors   Worry food won't last until get money to buy more Yes   Food not last or not have enough money for food? Yes   Do you have housing? (Housing is defined as stable permanent housing and does not include staying ouside in a car, in a tent, in an abandoned building, in an overnight shelter, or couch-surfing.) Yes   Are you worried about losing  your housing? No   Lack of transportation? No   Unable to get utilities (heat,electricity)? No      (!) FOOD SECURITY CONCERN PRESENT      9/17/2024   Fall Risk   Fallen 2 or more times in the past year? No   Trouble with walking or balance? No             9/17/2024   Activities of Daily Living- Home Safety   Needs help with the following daily activites Medication administration   Safety concerns in the home None of the above            9/17/2024   Dental   Dentist two times every year? (!) NO            9/17/2024   Hearing Screening   Hearing concerns? None of the above            9/17/2024   Driving Risk Screening   Patient/family members have concerns about driving No            9/17/2024   General Alertness/Fatigue Screening   Have you been more tired than usual lately? No            9/17/2024   Urinary Incontinence Screening   Bothered by leaking urine in past 6 months No            9/17/2024   TB Screening   Were you born outside of the [place]? No      Today's PHQ-2 Score:       9/17/2024    12:24 PM   PHQ-2 ( 1999 [employer])   Q1: Little interest or pleasure in doing things 1   Q2: Feeling down, depressed or hopeless 1   PHQ-2 Score 2   Q1: Little interest or pleasure in doing things Several days   Q2: Feeling down, depressed or hopeless Several days   PHQ-2 Score 2           9/17/2024   Substance Use   Alcohol more than 3/day or more than 7/wk Not Applicable   Do you have a current opioid prescription? No   How severe/bad is pain from 1 to 10? 0/10 (No Pain)   Do you use any other substances recreationally? No        Social History     Tobacco Use    Smoking status: Never    Smokeless tobacco: Never   Vaping Use    Vaping status: Never Used   Substance Use Topics    Alcohol use: Not Currently    Drug use: Never         6/26/2023   LAST FHS-7 RESULTS   1st degree relative breast or ovarian cancer No   Any relative bilateral breast cancer No   Any male have breast cancer No   Any ONE woman have BOTH breast AND  ovarian cancer No   Any woman with breast cancer before 50yrs No   2 or more relatives with breast AND/OR ovarian cancer No   2 or more relatives with breast AND/OR bowel cancer No      Mammogram Screening - Mammogram every 1-2 years updated in Health Maintenance based on mutual decision making      History of abnormal Pap smear: No - age 30- 64 PAP with HPV every 5 years recommended        Latest Ref Rng & Units 3/21/2022     9:46 AM   PAP / HPV   PAP  Negative for Intraepithelial Lesion or Malignancy (NILM)    HPV 16 DNA Negative Negative    HPV 18 DNA Negative Negative    Other HR HPV Negative Negative      ASCVD Risk   The 10-year ASCVD risk score (Seth MUELLER, et al., 2019) is: 2.3%    Values used to calculate the score:      Age: 48 years      Sex: Female      Is Non- : No      Diabetic: No      Tobacco smoker: No      Systolic Blood Pressure: 126 mmHg      Is BP treated: No      HDL Cholesterol: 45 mg/dL      Total Cholesterol: 271 mg/dL          9/17/2024   Contraception/Family Planning   Questions about contraception or family planning No      Reviewed and updated as needed this visit by Provider                    Current providers sharing in care for this patient include:  Patient Care Team:  Nancy Garrett APRN CNP as PCP - General (Family Medicine)  Nancy Garrett APRN CNP as Assigned PCP    The following health maintenance items are reviewed in Epic and correct as of today:  Health Maintenance   Topic Date Due    ANNUAL REVIEW OF  ORDERS  Never done    COLORECTAL CANCER SCREENING  Never done    HEPATITIS C SCREENING  Never done    HEPATITIS B IMMUNIZATION (2 of 3 - 19+ 3-dose series) 09/04/2017    MEDICARE ANNUAL WELLNESS VISIT  05/31/2024    TSH W/FREE T4 REFLEX  05/31/2024    INFLUENZA VACCINE (1) 09/01/2024    COVID-19 Vaccine (4 - 2024-25 season) 09/01/2024    ZOSTER IMMUNIZATION (1 of 2) 10/26/2023    MAMMO SCREENING  06/26/2025    GLUCOSE  05/31/2026    HPV  "TEST  03/21/2027    PAP  03/21/2027    DTAP/TDAP/TD IMMUNIZATION (8 - Td or Tdap) 04/03/2027    LIPID  05/31/2028    ADVANCE CARE PLANNING  06/01/2028    HIV SCREENING  Completed    PHQ-2 (once per calendar year)  Completed    Pneumococcal Vaccine: Pediatrics (0 to 5 Years) and At-Risk Patients (6 to 64 Years)  Aged Out    HPV IMMUNIZATION  Aged Out    MENINGITIS IMMUNIZATION  Aged Out    RSV MONOCLONAL ANTIBODY  Aged Out         Review of Systems  Constitutional, HEENT, cardiovascular, pulmonary, gi and gu systems are negative, except as otherwise noted.     Objective    Exam  /88   Pulse 88   Temp 98.3  F (36.8  C) (Tympanic)   Resp 20   Ht 1.683 m (5' 6.25\")   Wt 113.1 kg (249 lb 6.4 oz)   LMP 08/21/2024   SpO2 98%   BMI 39.95 kg/m     Estimated body mass index is 39.95 kg/m  as calculated from the following:    Height as of this encounter: 1.683 m (5' 6.25\").    Weight as of this encounter: 113.1 kg (249 lb 6.4 oz).    Physical Exam  GENERAL: alert and no distress  EYES: Eyes grossly normal to inspection, PERRL and conjunctivae and sclerae normal  HENT: ear canals and TM's normal, nose and mouth without ulcers or lesions  NECK: no adenopathy, no asymmetry, masses, or scars  RESP: lungs clear to auscultation - no rales, rhonchi or wheezes  CV: regular rate and rhythm, normal S1 S2, no S3 or S4, no murmur, click or rub, no peripheral edema  ABDOMEN: soft, nontender, no hepatosplenomegaly, no masses and bowel sounds normal  MS: no gross musculoskeletal defects noted, no edema  SKIN: no suspicious lesions or rashes  NEURO: Normal strength and tone, mentation intact and speech normal  PSYCH: mentation appears normal, affect normal/bright        9/17/2024   Mini Cog   Clock Draw Score 2 Normal   3 Item Recall 3 objects recalled   Mini Cog Total Score 5               Signed Electronically by: KALEB Aguilar CNP    "

## 2024-09-17 NOTE — PATIENT INSTRUCTIONS
Patient Education   Preventive Care Advice   This is general advice given by our system to help you stay healthy. However, your care team may have specific advice just for you. Please talk to your care team about your preventive care needs.  Nutrition  Eat 5 or more servings of fruits and vegetables each day.  Try wheat bread, brown rice and whole grain pasta (instead of white bread, rice, and pasta).  Get enough calcium and vitamin D. Check the label on foods and aim for 100% of the RDA (recommended daily allowance).  Lifestyle  Exercise at least 150 minutes each week  (30 minutes a day, 5 days a week).  Do muscle strengthening activities 2 days a week. These help control your weight and prevent disease.  No smoking.  Wear sunscreen to prevent skin cancer.  Have a dental exam and cleaning every 6 months.  Yearly exams  See your health care team every year to talk about:  Any changes in your health.  Any medicines your care team has prescribed.  Preventive care, family planning, and ways to prevent chronic diseases.  Shots (vaccines)   HPV shots (up to age 26), if you've never had them before.  Hepatitis B shots (up to age 59), if you've never had them before.  COVID-19 shot: Get this shot when it's due.  Flu shot: Get a flu shot every year.  Tetanus shot: Get a tetanus shot every 10 years.  Pneumococcal, hepatitis A, and RSV shots: Ask your care team if you need these based on your risk.  Shingles shot (for age 50 and up)  General health tests  Diabetes screening:  Starting at age 35, Get screened for diabetes at least every 3 years.  If you are younger than age 35, ask your care team if you should be screened for diabetes.  Cholesterol test: At age 39, start having a cholesterol test every 5 years, or more often if advised.  Bone density scan (DEXA): At age 50, ask your care team if you should have this scan for osteoporosis (brittle bones).  Hepatitis C: Get tested at least once in your life.  STIs (sexually  transmitted infections)  Before age 24: Ask your care team if you should be screened for STIs.  After age 24: Get screened for STIs if you're at risk. You are at risk for STIs (including HIV) if:  You are sexually active with more than one person.  You don't use condoms every time.  You or a partner was diagnosed with a sexually transmitted infection.  If you are at risk for HIV, ask about PrEP medicine to prevent HIV.  Get tested for HIV at least once in your life, whether you are at risk for HIV or not.  Cancer screening tests  Cervical cancer screening: If you have a cervix, begin getting regular cervical cancer screening tests starting at age 21.  Breast cancer scan (mammogram): If you've ever had breasts, begin having regular mammograms starting at age 40. This is a scan to check for breast cancer.  Colon cancer screening: It is important to start screening for colon cancer at age 45.  Have a colonoscopy test every 10 years (or more often if you're at risk) Or, ask your provider about stool tests like a FIT test every year or Cologuard test every 3 years.  To learn more about your testing options, visit:   .  For help making a decision, visit:   https://bit.ly/dj94961.  Prostate cancer screening test: If you have a prostate, ask your care team if a prostate cancer screening test (PSA) at age 55 is right for you.  Lung cancer screening: If you are a current or former smoker ages 50 to 80, ask your care team if ongoing lung cancer screenings are right for you.  For informational purposes only. Not to replace the advice of your health care provider. Copyright   2023 [facility]. All rights reserved. Clinically reviewed by the [facility]. [employer] [id number] - REV 01/24.  Learning About Activities of Daily Living  What are activities of daily living?     Activities of daily living (ADLs) are the basic self-care tasks you do every day. These include eating, bathing, dressing,  and moving around.  As you age, and if you have health problems, you may find that it's harder to do some of these tasks. If so, your doctor can suggest ideas that may help.  To measure what kind of help you may need, your doctor will ask how well you are able to do ADLs. Let your doctor know if there are any tasks that you are having trouble doing. This is an important first step to getting help. And when you have the help you need, you can stay as independent as possible.  How will a doctor assess your ADLs?  Asking about ADLs is part of a routine health checkup your doctor will likely do as you age. Your health check might be done in a doctor's office, in your home, or at a hospital. The goal is to find out if you are having any problems that could make it hard to care for yourself or that make it unsafe for you to be on your own.  To measure your ADLs, your doctor will ask how hard it is for you to do routine tasks. Your doctor may also want to know if you have changed the way you do a task because of a health problem. Your doctor may watch how you:  Walk back and forth.  Keep your balance while you stand or walk.  Move from sitting to standing or from a bed to a chair.  Button or unbutton a shirt or sweater.  Remove and put on your shoes.  It's common to feel a little worried or anxious if you find you can't do all the things you used to be able to do. Talking with your doctor about ADLs is a way to make sure you're as safe as possible and able to care for yourself as well as you can. You may want to bring a caregiver, friend, or family member to your checkup. They can help you talk to your doctor.  Follow-up care is a key part of your treatment and safety. Be sure to make and go to all appointments, and call your doctor if you are having problems. It's also a good idea to know your test results and keep a list of the medicines you take.  Current as of: October 24, 2023  Content Version: 14.1    0750-8531  [employer]wise, [employer].   Care instructions adapted under license by your healthcare professional. If you have questions about a medical condition or this instruction, always ask your healthcare professional. Healthwise, [employer] disclaims any warranty or liability for your use of this information.

## 2024-10-14 ENCOUNTER — LAB (OUTPATIENT)
Dept: LAB | Facility: CLINIC | Age: 49
End: 2024-10-14
Payer: MEDICARE

## 2024-10-14 DIAGNOSIS — E03.9 HYPOTHYROIDISM, UNSPECIFIED TYPE: ICD-10-CM

## 2024-10-14 DIAGNOSIS — R73.03 PREDIABETES: ICD-10-CM

## 2024-10-14 DIAGNOSIS — E78.5 HYPERLIPIDEMIA LDL GOAL <160: ICD-10-CM

## 2024-10-14 DIAGNOSIS — Z11.59 NEED FOR HEPATITIS C SCREENING TEST: ICD-10-CM

## 2024-10-14 LAB
CHOLEST SERPL-MCNC: 253 MG/DL
EST. AVERAGE GLUCOSE BLD GHB EST-MCNC: 117 MG/DL
FASTING STATUS PATIENT QL REPORTED: NO
HBA1C MFR BLD: 5.7 % (ref 0–5.6)
HDLC SERPL-MCNC: 42 MG/DL
LDLC SERPL CALC-MCNC: ABNORMAL MG/DL
NONHDLC SERPL-MCNC: 211 MG/DL
TRIGL SERPL-MCNC: 486 MG/DL
TSH SERPL DL<=0.005 MIU/L-ACNC: 2.74 UIU/ML (ref 0.3–4.2)

## 2024-10-14 PROCEDURE — 80061 LIPID PANEL: CPT

## 2024-10-14 PROCEDURE — 36415 COLL VENOUS BLD VENIPUNCTURE: CPT

## 2024-10-14 PROCEDURE — 83036 HEMOGLOBIN GLYCOSYLATED A1C: CPT

## 2024-10-14 PROCEDURE — 84443 ASSAY THYROID STIM HORMONE: CPT

## 2024-10-14 PROCEDURE — 83721 ASSAY OF BLOOD LIPOPROTEIN: CPT

## 2024-10-15 LAB — LDLC SERPL DIRECT ASSAY-MCNC: 154 MG/DL

## 2024-10-16 DIAGNOSIS — E78.5 HYPERLIPIDEMIA LDL GOAL <160: Primary | ICD-10-CM

## 2024-10-16 RX ORDER — SIMVASTATIN 20 MG
20 TABLET ORAL AT BEDTIME
Qty: 30 TABLET | Refills: 11 | Status: SHIPPED | OUTPATIENT
Start: 2024-10-16

## 2024-12-16 ENCOUNTER — OFFICE VISIT (OUTPATIENT)
Dept: FAMILY MEDICINE | Facility: CLINIC | Age: 49
End: 2024-12-16
Payer: MEDICARE

## 2024-12-16 VITALS
BODY MASS INDEX: 39.05 KG/M2 | SYSTOLIC BLOOD PRESSURE: 128 MMHG | WEIGHT: 243 LBS | HEART RATE: 87 BPM | RESPIRATION RATE: 14 BRPM | TEMPERATURE: 97.8 F | DIASTOLIC BLOOD PRESSURE: 84 MMHG | HEIGHT: 66 IN | OXYGEN SATURATION: 99 %

## 2024-12-16 DIAGNOSIS — E78.5 HYPERLIPIDEMIA LDL GOAL <160: Primary | ICD-10-CM

## 2024-12-16 LAB
ALBUMIN SERPL BCG-MCNC: 4.2 G/DL (ref 3.5–5.2)
ALP SERPL-CCNC: 96 U/L (ref 40–150)
ALT SERPL W P-5'-P-CCNC: 12 U/L (ref 0–50)
ANION GAP SERPL CALCULATED.3IONS-SCNC: 13 MMOL/L (ref 7–15)
AST SERPL W P-5'-P-CCNC: 17 U/L (ref 0–45)
BILIRUB DIRECT SERPL-MCNC: <0.2 MG/DL (ref 0–0.3)
BILIRUB SERPL-MCNC: 0.2 MG/DL
BUN SERPL-MCNC: 15.2 MG/DL (ref 6–20)
CALCIUM SERPL-MCNC: 9.8 MG/DL (ref 8.8–10.4)
CHLORIDE SERPL-SCNC: 100 MMOL/L (ref 98–107)
CHOLEST SERPL-MCNC: 227 MG/DL
CREAT SERPL-MCNC: 0.72 MG/DL (ref 0.51–0.95)
EGFRCR SERPLBLD CKD-EPI 2021: >90 ML/MIN/1.73M2
FASTING STATUS PATIENT QL REPORTED: NO
FASTING STATUS PATIENT QL REPORTED: NO
GLUCOSE SERPL-MCNC: 110 MG/DL (ref 70–99)
HCO3 SERPL-SCNC: 25 MMOL/L (ref 22–29)
HDLC SERPL-MCNC: 38 MG/DL
LDLC SERPL CALC-MCNC: ABNORMAL MG/DL
NONHDLC SERPL-MCNC: 189 MG/DL
POTASSIUM SERPL-SCNC: 4.1 MMOL/L (ref 3.4–5.3)
PROT SERPL-MCNC: 7.9 G/DL (ref 6.4–8.3)
SODIUM SERPL-SCNC: 138 MMOL/L (ref 135–145)
TRIGL SERPL-MCNC: 549 MG/DL

## 2024-12-16 PROCEDURE — 80053 COMPREHEN METABOLIC PANEL: CPT | Performed by: NURSE PRACTITIONER

## 2024-12-16 PROCEDURE — 83721 ASSAY OF BLOOD LIPOPROTEIN: CPT | Mod: 59 | Performed by: NURSE PRACTITIONER

## 2024-12-16 PROCEDURE — 99214 OFFICE O/P EST MOD 30 MIN: CPT | Performed by: NURSE PRACTITIONER

## 2024-12-16 PROCEDURE — G2211 COMPLEX E/M VISIT ADD ON: HCPCS | Performed by: NURSE PRACTITIONER

## 2024-12-16 PROCEDURE — 36415 COLL VENOUS BLD VENIPUNCTURE: CPT | Performed by: NURSE PRACTITIONER

## 2024-12-16 PROCEDURE — 82248 BILIRUBIN DIRECT: CPT | Performed by: NURSE PRACTITIONER

## 2024-12-16 PROCEDURE — 80061 LIPID PANEL: CPT | Performed by: NURSE PRACTITIONER

## 2024-12-16 ASSESSMENT — PAIN SCALES - GENERAL: PAINLEVEL_OUTOF10: NO PAIN (0)

## 2024-12-16 NOTE — PROGRESS NOTES
"  Assessment & Plan     Hyperlipidemia LDL goal <160  Not controlled. Plan to increase the simvastatin to 40 mg and add 2000 mg of fish oil to bring levels down in addition to lifestyle changes. Recheck with lab only appointment in 2 months.   - Comprehensive metabolic panel (BMP + Alb, Alk Phos, ALT, AST, Total. Bili, TP); Future  - Comprehensive metabolic panel (BMP + Alb, Alk Phos, ALT, AST, Total. Bili, TP)  - Lipid panel reflex to direct LDL Fasting  - Bilirubin direct  - LDL cholesterol direct  - simvastatin (ZOCOR) 40 MG tablet; Take 1 tablet (40 mg) by mouth at bedtime.  - Lipid panel reflex to direct LDL Fasting; Future  - Hepatic panel (Albumin, ALT, AST, Bili, Alk Phos, TP); Future      Subjective   Debbie is a 49 year old, presenting for the following health issues:  Lipids (Cholesterol recheck)        12/16/2024    12:58 PM   Additional Questions   Roomed by Laureen VELAZQUEZ   Accompanied by self         12/16/2024    12:58 PM   Patient Reported Additional Medications   Patient reports taking the following new medications no new meds     History of Present Illness       Hyperlipidemia:  She presents for follow up of hyperlipidemia.   She is taking medication to lower cholesterol. She is not having myalgia or other side effects to statin medications.    She eats 4 or more servings of fruits and vegetables daily.She consumes 1 sweetened beverage(s) daily.She exercises with enough effort to increase her heart rate 10 to 19 minutes per day.  She exercises with enough effort to increase her heart rate 3 or less days per week.   She is taking medications regularly.         Review of Systems  Constitutional, HEENT, cardiovascular, pulmonary, gi and gu systems are negative, except as otherwise noted.      Objective    /84   Pulse 87   Temp 97.8  F (36.6  C) (Tympanic)   Resp 14   Ht 1.683 m (5' 6.25\")   Wt 110.2 kg (243 lb)   LMP 11/25/2024   SpO2 99%   BMI 38.93 kg/m    Body mass index is 38.93 " kg/m .  Physical Exam   GENERAL: alert and no distress  PSYCH: mentation appears normal, affect normal/bright    Results for orders placed or performed in visit on 12/16/24   Comprehensive metabolic panel (BMP + Alb, Alk Phos, ALT, AST, Total. Bili, TP)     Status: Abnormal   Result Value Ref Range    Sodium 138 135 - 145 mmol/L    Potassium 4.1 3.4 - 5.3 mmol/L    Carbon Dioxide (CO2) 25 22 - 29 mmol/L    Anion Gap 13 7 - 15 mmol/L    Urea Nitrogen 15.2 6.0 - 20.0 mg/dL    Creatinine 0.72 0.51 - 0.95 mg/dL    GFR Estimate >90 >60 mL/min/1.73m2    Calcium 9.8 8.8 - 10.4 mg/dL    Chloride 100 98 - 107 mmol/L    Glucose 110 (H) 70 - 99 mg/dL    Alkaline Phosphatase 96 40 - 150 U/L    AST 17 0 - 45 U/L    ALT 12 0 - 50 U/L    Protein Total 7.9 6.4 - 8.3 g/dL    Albumin 4.2 3.5 - 5.2 g/dL    Bilirubin Total 0.2 <=1.2 mg/dL    Patient Fasting > 8hrs? No    Lipid panel reflex to direct LDL Fasting     Status: Abnormal   Result Value Ref Range    Cholesterol 227 (H) <200 mg/dL    Triglycerides 549 (H) <150 mg/dL    Direct Measure HDL 38 (L) >=50 mg/dL    LDL Cholesterol Calculated      Non HDL Cholesterol 189 (H) <130 mg/dL    Patient Fasting > 8hrs? No     Narrative    Cholesterol  Desirable: < 200 mg/dL  Borderline High: 200 - 239 mg/dL  High: >= 240 mg/dL    Triglycerides  Normal: < 150 mg/dL  Borderline High: 150 - 199 mg/dL  High: 200-499 mg/dL  Very High: >= 500 mg/dL    Direct Measure HDL  Female: >= 50 mg/dL   Male: >= 40 mg/dL    LDL Cholesterol  Desirable: < 100 mg/dL  Above Desirable: 100 - 129 mg/dL   Borderline High: 130 - 159 mg/dL   High:  160 - 189 mg/dL   Very High: >= 190 mg/dL    Non HDL Cholesterol  Desirable: < 130 mg/dL  Above Desirable: 130 - 159 mg/dL  Borderline High: 160 - 189 mg/dL  High: 190 - 219 mg/dL  Very High: >= 220 mg/dL   Bilirubin direct     Status: Normal   Result Value Ref Range    Bilirubin Direct <0.20 0.00 - 0.30 mg/dL   LDL cholesterol direct     Status: Abnormal   Result Value  Ref Range    LDL Cholesterol Direct 133 (H) <100 mg/dL           Signed Electronically by: KALEB Aguilar CNP

## 2024-12-17 LAB — LDLC SERPL DIRECT ASSAY-MCNC: 133 MG/DL

## 2024-12-18 ENCOUNTER — TELEPHONE (OUTPATIENT)
Dept: FAMILY MEDICINE | Facility: CLINIC | Age: 49
End: 2024-12-18
Payer: MEDICARE

## 2024-12-18 RX ORDER — SIMVASTATIN 40 MG
40 TABLET ORAL AT BEDTIME
Qty: 30 TABLET | Refills: 11 | Status: SHIPPED | OUTPATIENT
Start: 2024-12-18

## 2024-12-18 NOTE — TELEPHONE ENCOUNTER
Test Results        Who ordered the test:  PCP    Type of test: Lab    Date of test:  1216/2024    Where was the test performed:  NB    What are your questions/concerns?:  She would like to discuss the results    Okay to leave a detailed message?: Yes at Cell number on file:    Telephone Information:   Mobile [phone]

## 2024-12-19 ENCOUNTER — TELEPHONE (OUTPATIENT)
Dept: FAMILY MEDICINE | Facility: CLINIC | Age: 49
End: 2024-12-19
Payer: MEDICARE

## 2024-12-19 NOTE — TELEPHONE ENCOUNTER
Patient spoke with rn regarding results. See results note.     Will close as duplicate request.       Kelli DALE  Station

## 2024-12-19 NOTE — LETTER
Hello,    Here is the not from Nancy Garrett about your recent test results.    Please notify Debbie that her cholesterol is down slightly but not as much as expected with the medication.  -LDL(bad) cholesterol level is elevated, HDL(good) cholesterol level is low and your triglycerides are elevated which can increase your heart disease risk.  A diet high in fat and simple carbohydrates, genetics and being overweight can contribute to this. ADVISE: exercising 150 minutes of aerobic exercise per week (30 minutes for 5 days per week or 50 minutes for 3 days per week are options), eating a low saturated fat/low carbohydrate diet, and omega-3 fatty acids (fish oil) 2000 mg daily are helpful to improve this. I would like to increase the simvastatin to 40 mg. Plan recheck with a lab only appointment in 2 months, fast for 8-12 hours prior to labs.     Thanks,     KALEB Aguilar CNP

## 2024-12-19 NOTE — Clinical Note
December 19, 2024      Debbie Smalls  [address]   [place] [address]        {Comm Man dear:[id number]}          Sincerely,        KALEB Aguilar CNP

## 2024-12-19 NOTE — TELEPHONE ENCOUNTER
Test Results        Who ordered the test:  Nancy Garrett    Type of test: Lab    Date of test:  12/16/24    What are your questions/concerns?:  Pt wants copy mailed to her. Sent to address on file.     Okay to leave a detailed message?: Yes at Home number on file [phone] (home)    Agata Pollard on 12/19/2024 at 9:45 AM

## 2025-01-06 ENCOUNTER — OFFICE VISIT (OUTPATIENT)
Dept: URGENT CARE | Facility: URGENT CARE | Age: 50
End: 2025-01-06
Payer: MEDICARE

## 2025-01-06 VITALS
WEIGHT: 244 LBS | HEART RATE: 80 BPM | OXYGEN SATURATION: 98 % | SYSTOLIC BLOOD PRESSURE: 119 MMHG | BODY MASS INDEX: 39.09 KG/M2 | DIASTOLIC BLOOD PRESSURE: 74 MMHG | TEMPERATURE: 97.1 F | RESPIRATION RATE: 14 BRPM

## 2025-01-06 DIAGNOSIS — H61.23 BILATERAL IMPACTED CERUMEN: Primary | ICD-10-CM

## 2025-01-06 PROCEDURE — 69209 REMOVE IMPACTED EAR WAX UNI: CPT | Mod: 50 | Performed by: PHYSICIAN ASSISTANT

## 2025-01-06 NOTE — PROGRESS NOTES
"  Assessment & Plan     Bilateral impacted cerumen  Canals cleared. Follow up as needed   - AK REMOVAL IMPACTED CERUMEN IRRIGATION/LVG UNILAT          BMI  Estimated body mass index is 39.09 kg/m  as calculated from the following:    Height as of 12/16/24: 1.683 m (5' 6.25\").    Weight as of this encounter: 110.7 kg (244 lb).             No follow-ups on file.        Subjective   Chief Complaint   Patient presents with    Ear Problem     Left ear has been plugged for a week, no pain       HPI     Ear problem     Onset of symptoms was 1 week(s) ago.  Course of illness is same.    Severity moderate  Current and Associated symptoms: plugged ear   Treatment measures tried include over the counter drops.  Predisposing factors include None.                      Objective    /74   Pulse 80   Temp 97.1  F (36.2  C) (Tympanic)   Resp 14   Wt 110.7 kg (244 lb)   LMP 11/25/2024   SpO2 98%   BMI 39.09 kg/m    Body mass index is 39.09 kg/m .      Physical Exam  Constitutional:       General: She is not in acute distress.  HENT:      Right Ear: There is impacted cerumen.      Left Ear: There is impacted cerumen.      Ears:      Comments: Bilateral cerumen impaction. Ear lavage performed and canals cleared. Bilateral TM's gray and intact.      Neurological:      Mental Status: She is alert.                    Signed Electronically by: Cassie Jamison PA-C    "